# Patient Record
Sex: FEMALE | Race: WHITE | NOT HISPANIC OR LATINO
[De-identification: names, ages, dates, MRNs, and addresses within clinical notes are randomized per-mention and may not be internally consistent; named-entity substitution may affect disease eponyms.]

---

## 2017-11-10 ENCOUNTER — APPOINTMENT (OUTPATIENT)
Dept: MRI IMAGING | Facility: CLINIC | Age: 47
End: 2017-11-10
Payer: COMMERCIAL

## 2017-11-10 ENCOUNTER — APPOINTMENT (OUTPATIENT)
Dept: CARDIOLOGY | Facility: CLINIC | Age: 47
End: 2017-11-10
Payer: COMMERCIAL

## 2017-11-10 ENCOUNTER — OUTPATIENT (OUTPATIENT)
Dept: OUTPATIENT SERVICES | Facility: HOSPITAL | Age: 47
LOS: 1 days | End: 2017-11-10
Payer: COMMERCIAL

## 2017-11-10 DIAGNOSIS — Z00.8 ENCOUNTER FOR OTHER GENERAL EXAMINATION: ICD-10-CM

## 2017-11-10 PROCEDURE — 70551 MRI BRAIN STEM W/O DYE: CPT | Mod: 26

## 2017-11-10 PROCEDURE — 93880 EXTRACRANIAL BILAT STUDY: CPT

## 2017-11-10 PROCEDURE — 70551 MRI BRAIN STEM W/O DYE: CPT

## 2018-11-08 ENCOUNTER — RX RENEWAL (OUTPATIENT)
Age: 48
End: 2018-11-08

## 2018-12-28 ENCOUNTER — APPOINTMENT (OUTPATIENT)
Dept: PULMONOLOGY | Facility: CLINIC | Age: 48
End: 2018-12-28
Payer: COMMERCIAL

## 2018-12-28 VITALS
OXYGEN SATURATION: 97 % | TEMPERATURE: 98.8 F | DIASTOLIC BLOOD PRESSURE: 76 MMHG | RESPIRATION RATE: 12 BRPM | SYSTOLIC BLOOD PRESSURE: 122 MMHG | HEART RATE: 81 BPM

## 2018-12-28 DIAGNOSIS — Z87.09 PERSONAL HISTORY OF OTHER DISEASES OF THE RESPIRATORY SYSTEM: ICD-10-CM

## 2018-12-28 PROCEDURE — 71046 X-RAY EXAM CHEST 2 VIEWS: CPT

## 2018-12-28 PROCEDURE — 99214 OFFICE O/P EST MOD 30 MIN: CPT | Mod: 25

## 2019-02-20 ENCOUNTER — RX RENEWAL (OUTPATIENT)
Age: 49
End: 2019-02-20

## 2019-03-02 ENCOUNTER — RX RENEWAL (OUTPATIENT)
Age: 49
End: 2019-03-02

## 2019-03-02 DIAGNOSIS — K21.9 GASTRO-ESOPHAGEAL REFLUX DISEASE W/OUT ESOPHAGITIS: ICD-10-CM

## 2019-05-08 ENCOUNTER — RX RENEWAL (OUTPATIENT)
Age: 49
End: 2019-05-08

## 2019-05-29 ENCOUNTER — RESULT REVIEW (OUTPATIENT)
Age: 49
End: 2019-05-29

## 2019-08-19 DIAGNOSIS — D05.10 INTRADUCTAL CARCINOMA IN SITU OF UNSPECIFIED BREAST: ICD-10-CM

## 2019-08-21 ENCOUNTER — RX CHANGE (OUTPATIENT)
Age: 49
End: 2019-08-21

## 2019-08-21 ENCOUNTER — APPOINTMENT (OUTPATIENT)
Dept: PULMONOLOGY | Facility: CLINIC | Age: 49
End: 2019-08-21
Payer: COMMERCIAL

## 2019-08-21 VITALS
OXYGEN SATURATION: 98 % | DIASTOLIC BLOOD PRESSURE: 80 MMHG | TEMPERATURE: 97.5 F | HEART RATE: 91 BPM | SYSTOLIC BLOOD PRESSURE: 114 MMHG

## 2019-08-21 DIAGNOSIS — J32.9 CHRONIC SINUSITIS, UNSPECIFIED: ICD-10-CM

## 2019-08-21 DIAGNOSIS — C50.919 MALIGNANT NEOPLASM OF UNSPECIFIED SITE OF UNSPECIFIED FEMALE BREAST: ICD-10-CM

## 2019-08-21 PROBLEM — D05.10 DCIS (DUCTAL CARCINOMA IN SITU) OF BREAST: Status: ACTIVE | Noted: 2019-08-21

## 2019-08-21 LAB
ALBUMIN SERPL ELPH-MCNC: 4 G/DL
ALP BLD-CCNC: 145 U/L
ALT SERPL-CCNC: 22 U/L
ANION GAP SERPL CALC-SCNC: 13 MMOL/L
AST SERPL-CCNC: 28 U/L
BASOPHILS # BLD AUTO: 0.07 K/UL
BASOPHILS NFR BLD AUTO: 0.8 %
BILIRUB SERPL-MCNC: 0.2 MG/DL
BUN SERPL-MCNC: 14 MG/DL
CALCIUM SERPL-MCNC: 9.8 MG/DL
CHLORIDE SERPL-SCNC: 100 MMOL/L
CO2 SERPL-SCNC: 27 MMOL/L
CREAT SERPL-MCNC: 0.71 MG/DL
EOSINOPHIL # BLD AUTO: 0.34 K/UL
EOSINOPHIL NFR BLD AUTO: 4 %
GLUCOSE SERPL-MCNC: 100 MG/DL
HCT VFR BLD CALC: 43.5 %
HGB BLD-MCNC: 14.5 G/DL
IMM GRANULOCYTES NFR BLD AUTO: 0.5 %
LYMPHOCYTES # BLD AUTO: 1.78 K/UL
LYMPHOCYTES NFR BLD AUTO: 21 %
MAN DIFF?: NORMAL
MCHC RBC-ENTMCNC: 31.2 PG
MCHC RBC-ENTMCNC: 33.3 GM/DL
MCV RBC AUTO: 93.5 FL
MONOCYTES # BLD AUTO: 0.56 K/UL
MONOCYTES NFR BLD AUTO: 6.6 %
NEUTROPHILS # BLD AUTO: 5.69 K/UL
NEUTROPHILS NFR BLD AUTO: 67.1 %
PLATELET # BLD AUTO: 345 K/UL
POTASSIUM SERPL-SCNC: 4.6 MMOL/L
PROT SERPL-MCNC: 7.5 G/DL
RBC # BLD: 4.65 M/UL
RBC # FLD: 12.6 %
SODIUM SERPL-SCNC: 140 MMOL/L
WBC # FLD AUTO: 8.48 K/UL

## 2019-08-21 PROCEDURE — 99214 OFFICE O/P EST MOD 30 MIN: CPT | Mod: 25

## 2019-08-21 PROCEDURE — 71046 X-RAY EXAM CHEST 2 VIEWS: CPT

## 2019-08-21 PROCEDURE — 94060 EVALUATION OF WHEEZING: CPT

## 2019-08-21 PROCEDURE — 36415 COLL VENOUS BLD VENIPUNCTURE: CPT

## 2019-08-21 RX ORDER — HYDROCODONE BITARTRATE AND HOMATROPINE METHYLBROMIDE 5; 1.5 MG/5ML; MG/5ML
5-1.5 SYRUP ORAL
Qty: 200 | Refills: 0 | Status: DISCONTINUED | COMMUNITY
Start: 2018-12-28 | End: 2019-08-21

## 2019-08-21 RX ORDER — PREDNISONE 10 MG/1
10 TABLET ORAL
Qty: 30 | Refills: 1 | Status: DISCONTINUED | COMMUNITY
Start: 2018-12-28 | End: 2019-08-21

## 2019-08-21 RX ORDER — CEFUROXIME AXETIL 500 MG/1
500 TABLET ORAL
Qty: 14 | Refills: 0 | Status: DISCONTINUED | COMMUNITY
Start: 2019-08-21 | End: 2019-08-21

## 2019-08-21 NOTE — PHYSICAL EXAM
[General Appearance - Well Developed] : well developed [Normal Appearance] : normal appearance [Well Groomed] : well groomed [No Deformities] : no deformities [General Appearance - Well Nourished] : well nourished [Normal Conjunctiva] : the conjunctiva exhibited no abnormalities [General Appearance - In No Acute Distress] : no acute distress [Eyelids - No Xanthelasma] : the eyelids demonstrated no xanthelasmas [Normal Oropharynx] : normal oropharynx [I] : I [Neck Appearance] : the appearance of the neck was normal [Neck Cervical Mass (___cm)] : no neck mass was observed [Jugular Venous Distention Increased] : there was no jugular-venous distention [Thyroid Diffuse Enlargement] : the thyroid was not enlarged [Heart Rate And Rhythm] : heart rate and rhythm were normal [Heart Sounds] : normal S1 and S2 [Murmurs] : no murmurs present [Arterial Pulses Normal] : the arterial pulses were normal [Edema] : no peripheral edema present [Veins - Varicosity Changes] : no varicosital changes were noted in the lower extremities [Respiration, Rhythm And Depth] : normal respiratory rhythm and effort [Exaggerated Use Of Accessory Muscles For Inspiration] : no accessory muscle use [Auscultation Breath Sounds / Voice Sounds] : lungs were clear to auscultation bilaterally [Chest Palpation] : palpation of the chest revealed no abnormalities [Lungs Percussion] : the lungs were normal to percussion [Gait - Sufficient For Exercise Testing] : the gait was sufficient for exercise testing [Abnormal Walk] : normal gait [Nail Clubbing] : no clubbing of the fingernails [Cyanosis, Localized] : no localized cyanosis [Petechial Hemorrhages (___cm)] : no petechial hemorrhages [Skin Color & Pigmentation] : normal skin color and pigmentation [] : no rash [No Venous Stasis] : no venous stasis [Skin Lesions] : no skin lesions [No Skin Ulcers] : no skin ulcer [No Xanthoma] : no  xanthoma was observed [Deep Tendon Reflexes (DTR)] : deep tendon reflexes were 2+ and symmetric [Sensation] : the sensory exam was normal to light touch and pinprick [Oriented To Time, Place, And Person] : oriented to person, place, and time [No Focal Deficits] : no focal deficits [Impaired Insight] : insight and judgment were intact [Affect] : the affect was normal [FreeTextEntry1] : Negative stridor

## 2019-08-21 NOTE — REVIEW OF SYSTEMS
[Nasal Congestion] : nasal congestion [Sinus Problems] : sinus problems [Cough] : cough [Hypertension] : ~T hypertension [Negative] : Endocrine [Dry Eyes] : no dryness of the eyes [Eye Irritation] : no ~T irritation of the eyes [Ear Disturbance] : no ear disturbance [Epistaxis] : no nosebleeds [Postnasal Drip] : no postnasal drip [Sore Throat] : no sore throat [Dry Mouth] : no dry mouth [Mouth Ulcers] : no mouth ulcers [Sputum] : not coughing up ~M sputum [Hemoptysis] : no hemoptysis [Dyspnea] : no dyspnea [Chest Tightness] : no chest tightness [Pleuritic Pain] : no pleuritic pain [Frequent URIs] : no frequent upper respiratory infections [Wheezing] : no wheezing [de-identified] : New dx DCIS Breast cancer

## 2019-08-21 NOTE — PROCEDURE
[FreeTextEntry1] : Chest x-ray PA lateral August 21, 2019\par New diagnosis breast cancer DCIS\par Cardiac size normal\par Clear lung fields without evidence of parenchymal infiltrates, pulmonary nodules, pleural effusions, mediastinal adenopathy.\par Soft tissue bony structures normal.\par Impression clear lungs\par \par Spirometry August 21, 2019\par Normal spirometry\par 50% response to bronchodilator at the FEV1\par \par Blood draw for CBC comprehensive metabolic profile\par Data review\par CBC normal range\par White blood count 8.48 hemoglobin 14.5 hematocrit 43.5%\par Platelet count 345,000\par Serum lites normal\par LFTs normal but noted mild elevation alkaline phosphatase 145

## 2019-08-21 NOTE — HISTORY OF PRESENT ILLNESS
[FreeTextEntry1] : Status post biopsy right breast\par Positive for invasive ductal carcinoma moderately differentiated\par High-grade nuclear grade solid type with central necrosis and calcification\par Chief complaint today active sinus symptoms with pain at sinus nasal congestion minimal cough without wheeze\par No fevers chills or sweats\par Description of purulent sputum\par No hemoptysis\par No associated chest pain\par

## 2019-08-21 NOTE — REVIEW OF SYSTEMS
[Nasal Congestion] : nasal congestion [Sinus Problems] : sinus problems [Cough] : cough [Hypertension] : ~T hypertension [Negative] : Endocrine [Dry Eyes] : no dryness of the eyes [Eye Irritation] : no ~T irritation of the eyes [Ear Disturbance] : no ear disturbance [Epistaxis] : no nosebleeds [Postnasal Drip] : no postnasal drip [Sore Throat] : no sore throat [Dry Mouth] : no dry mouth [Mouth Ulcers] : no mouth ulcers [Sputum] : not coughing up ~M sputum [Hemoptysis] : no hemoptysis [Dyspnea] : no dyspnea [Chest Tightness] : no chest tightness [Pleuritic Pain] : no pleuritic pain [Frequent URIs] : no frequent upper respiratory infections [Wheezing] : no wheezing [de-identified] : New dx DCIS Breast cancer

## 2019-08-21 NOTE — PHYSICAL EXAM
[General Appearance - Well Developed] : well developed [Normal Appearance] : normal appearance [Well Groomed] : well groomed [General Appearance - Well Nourished] : well nourished [No Deformities] : no deformities [General Appearance - In No Acute Distress] : no acute distress [Normal Conjunctiva] : the conjunctiva exhibited no abnormalities [Eyelids - No Xanthelasma] : the eyelids demonstrated no xanthelasmas [Normal Oropharynx] : normal oropharynx [I] : I [Neck Appearance] : the appearance of the neck was normal [Neck Cervical Mass (___cm)] : no neck mass was observed [Jugular Venous Distention Increased] : there was no jugular-venous distention [Thyroid Diffuse Enlargement] : the thyroid was not enlarged [Heart Sounds] : normal S1 and S2 [Heart Rate And Rhythm] : heart rate and rhythm were normal [Murmurs] : no murmurs present [Edema] : no peripheral edema present [Arterial Pulses Normal] : the arterial pulses were normal [Veins - Varicosity Changes] : no varicosital changes were noted in the lower extremities [Respiration, Rhythm And Depth] : normal respiratory rhythm and effort [Exaggerated Use Of Accessory Muscles For Inspiration] : no accessory muscle use [Auscultation Breath Sounds / Voice Sounds] : lungs were clear to auscultation bilaterally [Chest Palpation] : palpation of the chest revealed no abnormalities [Lungs Percussion] : the lungs were normal to percussion [Gait - Sufficient For Exercise Testing] : the gait was sufficient for exercise testing [Abnormal Walk] : normal gait [Nail Clubbing] : no clubbing of the fingernails [Cyanosis, Localized] : no localized cyanosis [Petechial Hemorrhages (___cm)] : no petechial hemorrhages [Skin Color & Pigmentation] : normal skin color and pigmentation [] : no rash [No Venous Stasis] : no venous stasis [No Skin Ulcers] : no skin ulcer [Skin Lesions] : no skin lesions [No Xanthoma] : no  xanthoma was observed [Deep Tendon Reflexes (DTR)] : deep tendon reflexes were 2+ and symmetric [Sensation] : the sensory exam was normal to light touch and pinprick [No Focal Deficits] : no focal deficits [Oriented To Time, Place, And Person] : oriented to person, place, and time [Impaired Insight] : insight and judgment were intact [Affect] : the affect was normal [FreeTextEntry1] : Negative stridor

## 2019-08-21 NOTE — DISCUSSION/SUMMARY
[FreeTextEntry1] : Sinusitis acute\par Reactivity on spirometry\par New diagnosis breast cancer DCIS\par \par Treatment protocol for sinusitis\par Zyrtec-D\par Nasal Flonase\par Ceftin 500 mg 1 tablet p.o. twice daily x7 days-quested change\par Doxycycline 100 mg 1 tablet p.o. twice daily\par Medrol Dosepak\par \par Patient has scheduled breast surgical consultation this afternoon Diane Cavazos MD

## 2019-08-23 ENCOUNTER — RX RENEWAL (OUTPATIENT)
Age: 49
End: 2019-08-23

## 2019-09-10 ENCOUNTER — RESULT REVIEW (OUTPATIENT)
Age: 49
End: 2019-09-10

## 2019-10-01 ENCOUNTER — RX CHANGE (OUTPATIENT)
Age: 49
End: 2019-10-01

## 2019-10-01 RX ORDER — OLMESARTAN MEDOXOMIL AND HYDROCHLOROTHIAZIDE 40; 12.5 MG/1; MG/1
40-12.5 TABLET ORAL DAILY
Qty: 90 | Refills: 1 | Status: DISCONTINUED | COMMUNITY
Start: 2019-09-10 | End: 2019-10-01

## 2020-02-21 ENCOUNTER — RX RENEWAL (OUTPATIENT)
Age: 50
End: 2020-02-21

## 2020-08-20 ENCOUNTER — RX RENEWAL (OUTPATIENT)
Age: 50
End: 2020-08-20

## 2020-09-11 ENCOUNTER — RX RENEWAL (OUTPATIENT)
Age: 50
End: 2020-09-11

## 2020-12-16 PROBLEM — Z87.09 HISTORY OF ACUTE BRONCHITIS: Status: RESOLVED | Noted: 2018-12-28 | Resolved: 2020-12-16

## 2020-12-18 ENCOUNTER — RX RENEWAL (OUTPATIENT)
Age: 50
End: 2020-12-18

## 2021-01-27 ENCOUNTER — RX RENEWAL (OUTPATIENT)
Age: 51
End: 2021-01-27

## 2021-03-07 ENCOUNTER — RX RENEWAL (OUTPATIENT)
Age: 51
End: 2021-03-07

## 2021-06-16 ENCOUNTER — RX RENEWAL (OUTPATIENT)
Age: 51
End: 2021-06-16

## 2021-07-06 ENCOUNTER — NON-APPOINTMENT (OUTPATIENT)
Age: 51
End: 2021-07-06

## 2021-07-09 ENCOUNTER — APPOINTMENT (OUTPATIENT)
Dept: PULMONOLOGY | Facility: CLINIC | Age: 51
End: 2021-07-09
Payer: COMMERCIAL

## 2021-07-09 VITALS
TEMPERATURE: 98.1 F | DIASTOLIC BLOOD PRESSURE: 76 MMHG | HEART RATE: 86 BPM | OXYGEN SATURATION: 96 % | SYSTOLIC BLOOD PRESSURE: 116 MMHG

## 2021-07-09 PROCEDURE — 71046 X-RAY EXAM CHEST 2 VIEWS: CPT

## 2021-07-09 PROCEDURE — 94729 DIFFUSING CAPACITY: CPT

## 2021-07-09 PROCEDURE — ZZZZZ: CPT

## 2021-07-09 PROCEDURE — 88738 HGB QUANT TRANSCUTANEOUS: CPT

## 2021-07-09 PROCEDURE — 99214 OFFICE O/P EST MOD 30 MIN: CPT | Mod: 25

## 2021-07-09 PROCEDURE — 94727 GAS DIL/WSHOT DETER LNG VOL: CPT

## 2021-07-09 PROCEDURE — 94010 BREATHING CAPACITY TEST: CPT

## 2021-07-09 RX ORDER — DOXYCYCLINE HYCLATE 100 MG/1
100 CAPSULE ORAL
Qty: 14 | Refills: 0 | Status: DISCONTINUED | COMMUNITY
Start: 2019-08-21 | End: 2021-07-09

## 2021-07-09 RX ORDER — TAMOXIFEN CITRATE 20 MG/1
20 TABLET, FILM COATED ORAL DAILY
Refills: 0 | Status: ACTIVE | COMMUNITY
Start: 2021-07-09

## 2021-07-09 RX ORDER — ALBUTEROL SULFATE 90 UG/1
108 (90 BASE) INHALANT RESPIRATORY (INHALATION)
Qty: 1 | Refills: 3 | Status: ACTIVE | COMMUNITY
Start: 2021-07-09 | End: 1900-01-01

## 2021-07-09 RX ORDER — FLUTICASONE PROPIONATE 50 UG/1
50 SPRAY, METERED NASAL
Qty: 1 | Refills: 3 | Status: DISCONTINUED | COMMUNITY
Start: 2019-08-21 | End: 2021-07-09

## 2021-07-09 RX ORDER — METHYLPREDNISOLONE 4 MG/1
4 TABLET ORAL
Qty: 1 | Refills: 0 | Status: DISCONTINUED | COMMUNITY
Start: 2019-08-21 | End: 2021-07-09

## 2021-07-09 NOTE — REVIEW OF SYSTEMS
[Nasal Congestion] : nasal congestion [Sinus Problems] : sinus problems [Cough] : cough [Hypertension] : ~T hypertension [Negative] : Endocrine [Dry Eyes] : no dryness of the eyes [Eye Irritation] : no ~T irritation of the eyes [Ear Disturbance] : no ear disturbance [Epistaxis] : no nosebleeds [Postnasal Drip] : no postnasal drip [Sore Throat] : no sore throat [Dry Mouth] : no dry mouth [Mouth Ulcers] : no mouth ulcers [Sputum] : not coughing up ~M sputum [Hemoptysis] : no hemoptysis [Dyspnea] : no dyspnea [Chest Tightness] : no chest tightness [Pleuritic Pain] : no pleuritic pain [Frequent URIs] : no frequent upper respiratory infections [Wheezing] : no wheezing [de-identified] : New dx DCIS Breast cancer

## 2021-07-09 NOTE — HISTORY OF PRESENT ILLNESS
[FreeTextEntry1] : 3 of inflammatory airway disease asthma\par Overall status is stable\par States no decline in respiratory status over time\par Denies active wheezing cough purulent sputum\par No history of reported COVID-19 infection\par Occasional use of her short acting bronchodilator when she is exposed to rabbits\par \par  biopsy right breast\par Positive for invasive ductal carcinoma moderately differentiated\par High-grade nuclear grade solid type with central necrosis and calcification\par overall feels lung fx is  stable\par occ prn use GRACY with rabbit exposure\par No fevers chills or sweats\par Description of purulent sputum\par No hemoptysis\par No associated chest pain\par

## 2021-07-09 NOTE — DISCUSSION/SUMMARY
[FreeTextEntry1] :  diagnosis breast cancer DCIS\par sinusitis hx\par OAD\par Rx updated\par No evidence for active respiratory impairment\par Stable pulmonary physiology\par As needed short acting bronchodilator\par Notify of any wheezing.  Notify if rescue inhaler is needed greater than 2-3 times in any week.  Avoid known triggers.\par COVID-19 precautions\par 1 year recheck

## 2021-07-09 NOTE — PROCEDURE
[FreeTextEntry1] : PFT without bronchodilator July 9, 2021\par Flow rates are normal\par Lung volumes normal\par Total lung capacity 93% predicted\par Diffusion normal 95% predicted\par Stable pulmonary physiology\par Hemoglobin 11.2\par \par Chest x-ray PA lateral  7/9/21\par  diagnosis breast cancer DCIS\par Cardiac size normal\par Clear lung fields without evidence of parenchymal infiltrates, pulmonary nodules, pleural effusions, mediastinal adenopathy.\par Soft tissue bony structures normal.\par Impression clear lungs\par \par Spirometry August 21, 2019\par Normal spirometry\par 50% response to bronchodilator at the FEV1\par \par Blood draw for CBC comprehensive metabolic profile\par Data review\par CBC normal range\par White blood count 8.48 hemoglobin 14.5 hematocrit 43.5%\par Platelet count 345,000\par Serum lites normal\par LFTs normal but noted mild elevation alkaline phosphatase 145

## 2021-07-09 NOTE — PHYSICAL EXAM
[General Appearance - Well Developed] : well developed [Normal Appearance] : normal appearance [Well Groomed] : well groomed [General Appearance - Well Nourished] : well nourished [No Deformities] : no deformities [General Appearance - In No Acute Distress] : no acute distress [Normal Conjunctiva] : the conjunctiva exhibited no abnormalities [Eyelids - No Xanthelasma] : the eyelids demonstrated no xanthelasmas [Normal Oropharynx] : normal oropharynx [I] : I [Neck Appearance] : the appearance of the neck was normal [Neck Cervical Mass (___cm)] : no neck mass was observed [Jugular Venous Distention Increased] : there was no jugular-venous distention [Thyroid Diffuse Enlargement] : the thyroid was not enlarged [Heart Rate And Rhythm] : heart rate and rhythm were normal [Heart Sounds] : normal S1 and S2 [Murmurs] : no murmurs present [Arterial Pulses Normal] : the arterial pulses were normal [Edema] : no peripheral edema present [Veins - Varicosity Changes] : no varicosital changes were noted in the lower extremities [Respiration, Rhythm And Depth] : normal respiratory rhythm and effort [Exaggerated Use Of Accessory Muscles For Inspiration] : no accessory muscle use [Auscultation Breath Sounds / Voice Sounds] : lungs were clear to auscultation bilaterally [Chest Palpation] : palpation of the chest revealed no abnormalities [Lungs Percussion] : the lungs were normal to percussion [Abnormal Walk] : normal gait [Gait - Sufficient For Exercise Testing] : the gait was sufficient for exercise testing [Nail Clubbing] : no clubbing of the fingernails [Cyanosis, Localized] : no localized cyanosis [Petechial Hemorrhages (___cm)] : no petechial hemorrhages [Skin Color & Pigmentation] : normal skin color and pigmentation [] : no rash [No Venous Stasis] : no venous stasis [Skin Lesions] : no skin lesions [No Skin Ulcers] : no skin ulcer [No Xanthoma] : no  xanthoma was observed [Deep Tendon Reflexes (DTR)] : deep tendon reflexes were 2+ and symmetric [Sensation] : the sensory exam was normal to light touch and pinprick [No Focal Deficits] : no focal deficits [Oriented To Time, Place, And Person] : oriented to person, place, and time [Impaired Insight] : insight and judgment were intact [Affect] : the affect was normal [FreeTextEntry1] : Negative stridor

## 2021-08-12 DIAGNOSIS — Z00.00 ENCOUNTER FOR GENERAL ADULT MEDICAL EXAMINATION W/OUT ABNORMAL FINDINGS: ICD-10-CM

## 2021-08-13 LAB
ALBUMIN SERPL ELPH-MCNC: 4.6 G/DL
ALP BLD-CCNC: 92 U/L
ALT SERPL-CCNC: 30 U/L
ANION GAP SERPL CALC-SCNC: 16 MMOL/L
AST SERPL-CCNC: 61 U/L
BASOPHILS # BLD AUTO: 0.05 K/UL
BASOPHILS NFR BLD AUTO: 0.5 %
BILIRUB SERPL-MCNC: 0.3 MG/DL
BUN SERPL-MCNC: 14 MG/DL
CALCIUM SERPL-MCNC: 9.5 MG/DL
CHLORIDE SERPL-SCNC: 101 MMOL/L
CO2 SERPL-SCNC: 23 MMOL/L
CREAT SERPL-MCNC: 0.87 MG/DL
CRP SERPL-MCNC: 14 MG/L
EOSINOPHIL # BLD AUTO: 0.42 K/UL
EOSINOPHIL NFR BLD AUTO: 4.4 %
ERYTHROCYTE [SEDIMENTATION RATE] IN BLOOD BY WESTERGREN METHOD: 34 MM/HR
GLUCOSE SERPL-MCNC: 125 MG/DL
HCT VFR BLD CALC: 39.7 %
HGB BLD-MCNC: 12.7 G/DL
IMM GRANULOCYTES NFR BLD AUTO: 0.2 %
LYMPHOCYTES # BLD AUTO: 1.74 K/UL
LYMPHOCYTES NFR BLD AUTO: 18.4 %
MAN DIFF?: NORMAL
MCHC RBC-ENTMCNC: 31.5 PG
MCHC RBC-ENTMCNC: 32 GM/DL
MCV RBC AUTO: 98.5 FL
MONOCYTES # BLD AUTO: 0.65 K/UL
MONOCYTES NFR BLD AUTO: 6.9 %
NEUTROPHILS # BLD AUTO: 6.56 K/UL
NEUTROPHILS NFR BLD AUTO: 69.6 %
PLATELET # BLD AUTO: 254 K/UL
POTASSIUM SERPL-SCNC: 4.1 MMOL/L
PROT SERPL-MCNC: 7.5 G/DL
RBC # BLD: 4.03 M/UL
RBC # FLD: 12.4 %
SODIUM SERPL-SCNC: 140 MMOL/L
WBC # FLD AUTO: 9.44 K/UL

## 2021-08-14 LAB — RPR SER-TITR: NORMAL

## 2021-08-31 ENCOUNTER — RX RENEWAL (OUTPATIENT)
Age: 51
End: 2021-08-31

## 2021-09-28 ENCOUNTER — APPOINTMENT (OUTPATIENT)
Dept: DERMATOLOGY | Facility: CLINIC | Age: 51
End: 2021-09-28
Payer: COMMERCIAL

## 2021-09-28 PROCEDURE — 99204 OFFICE O/P NEW MOD 45 MIN: CPT

## 2021-09-28 RX ORDER — HYDROCORTISONE 25 MG/G
2.5 OINTMENT TOPICAL
Qty: 1 | Refills: 1 | Status: ACTIVE | COMMUNITY
Start: 2021-09-28 | End: 1900-01-01

## 2021-11-10 ENCOUNTER — APPOINTMENT (OUTPATIENT)
Dept: DERMATOLOGY | Facility: CLINIC | Age: 51
End: 2021-11-10

## 2021-12-12 ENCOUNTER — RX RENEWAL (OUTPATIENT)
Age: 51
End: 2021-12-12

## 2022-02-24 ENCOUNTER — RX RENEWAL (OUTPATIENT)
Age: 52
End: 2022-02-24

## 2022-03-02 ENCOUNTER — RX RENEWAL (OUTPATIENT)
Age: 52
End: 2022-03-02

## 2022-03-28 ENCOUNTER — APPOINTMENT (OUTPATIENT)
Dept: PULMONOLOGY | Facility: CLINIC | Age: 52
End: 2022-03-28
Payer: COMMERCIAL

## 2022-03-28 VITALS
HEART RATE: 96 BPM | OXYGEN SATURATION: 99 % | SYSTOLIC BLOOD PRESSURE: 145 MMHG | TEMPERATURE: 97.2 F | DIASTOLIC BLOOD PRESSURE: 94 MMHG

## 2022-03-28 DIAGNOSIS — R06.2 WHEEZING: ICD-10-CM

## 2022-03-28 DIAGNOSIS — R05.9 COUGH, UNSPECIFIED: ICD-10-CM

## 2022-03-28 PROCEDURE — 71046 X-RAY EXAM CHEST 2 VIEWS: CPT

## 2022-03-28 PROCEDURE — 99214 OFFICE O/P EST MOD 30 MIN: CPT | Mod: 25

## 2022-03-28 PROCEDURE — 94010 BREATHING CAPACITY TEST: CPT

## 2022-03-28 RX ORDER — MONTELUKAST SODIUM 10 MG/1
10 TABLET, FILM COATED ORAL
Qty: 1 | Refills: 3 | Status: DISCONTINUED | COMMUNITY
Start: 2021-07-09 | End: 2022-03-28

## 2022-03-28 RX ORDER — CHLORHEXIDINE GLUCONATE, 0.12% ORAL RINSE 1.2 MG/ML
0.12 SOLUTION DENTAL
Qty: 473 | Refills: 0 | Status: DISCONTINUED | COMMUNITY
Start: 2021-12-21

## 2022-03-28 RX ORDER — FLUTICASONE FUROATE AND VILANTEROL TRIFENATATE 200; 25 UG/1; UG/1
200-25 POWDER RESPIRATORY (INHALATION)
Qty: 1 | Refills: 3 | Status: DISCONTINUED | COMMUNITY
Start: 2021-07-27 | End: 2022-03-28

## 2022-03-28 RX ORDER — MELOXICAM 15 MG/1
15 TABLET ORAL
Qty: 90 | Refills: 0 | Status: DISCONTINUED | COMMUNITY
Start: 2021-11-15

## 2022-03-28 RX ORDER — TACROLIMUS 1 MG/G
0.1 OINTMENT TOPICAL
Qty: 60 | Refills: 0 | Status: DISCONTINUED | COMMUNITY
Start: 2021-09-28 | End: 2022-03-28

## 2022-03-28 RX ORDER — DOXYCYCLINE HYCLATE 100 MG/1
100 TABLET ORAL
Qty: 60 | Refills: 0 | Status: DISCONTINUED | COMMUNITY
Start: 2021-09-28 | End: 2022-03-28

## 2022-03-28 RX ORDER — AMOXICILLIN 500 MG/1
500 CAPSULE ORAL
Qty: 30 | Refills: 0 | Status: DISCONTINUED | COMMUNITY
Start: 2021-12-21

## 2022-03-28 NOTE — DISCUSSION/SUMMARY
[FreeTextEntry1] : Muscle cramps\par Hold hydrochlorothiazide still blood pressure not well controlled\par Check serum electrolytes magnesium phosphorus\par Continue losartan 100 mg daily\par Add low-dose Norvasc 2.5 mg\par Recheck blood pressure 1 month\par Add Medrol Dosepak Z-Deejay for cough repeat PFT 1 month\par has  been off BREO\par  add symbicort 2 puffs BID and Rinse \par Notify of any wheezing.  Notify if rescue inhaler is needed greater than 2-3 times in any week.  Avoid known triggers.\par \par \par  diagnosis breast cancer DCIS\par sinusitis hx\par OAD\par Rx updated\par No evidence for active respiratory impairment\par Stable pulmonary physiology\par As needed short acting bronchodilator\par Notify of any wheezing.  Notify if rescue inhaler is needed greater than 2-3 times in any week.  Avoid known triggers.\par COVID-19 precautions\par 1 year recheck

## 2022-03-28 NOTE — REVIEW OF SYSTEMS
[Nasal Congestion] : nasal congestion [Sinus Problems] : sinus problems [Cough] : cough [Hypertension] : ~T hypertension [Negative] : Endocrine [Dry Eyes] : no dryness of the eyes [Eye Irritation] : no ~T irritation of the eyes [Ear Disturbance] : no ear disturbance [Epistaxis] : no nosebleeds [Postnasal Drip] : no postnasal drip [Sore Throat] : no sore throat [Dry Mouth] : no dry mouth [Mouth Ulcers] : no mouth ulcers [Sputum] : not coughing up ~M sputum [Hemoptysis] : no hemoptysis [Dyspnea] : no dyspnea [Chest Tightness] : no chest tightness [Pleuritic Pain] : no pleuritic pain [Frequent URIs] : no frequent upper respiratory infections [Wheezing] : no wheezing [de-identified] : New dx DCIS Breast cancer

## 2022-03-28 NOTE — HISTORY OF PRESENT ILLNESS
[FreeTextEntry1] : am awakening with lower ext muscle cramps\par dry hacking cough\par pos wheeze\par use of GRACY but muscle cramps precede the cough sxs\par  sputum\par \par Hx of inflammatory airway disease asthma\par Overall status is stable\par States no decline in respiratory status over time\par Denies active wheezing cough purulent sputum\par No history of reported COVID-19 infection\par Occasional use of her short acting bronchodilator when she is exposed to rabbits\par \par  biopsy right breast\par Positive for invasive ductal carcinoma moderately differentiated\par High-grade nuclear grade solid type with central necrosis and calcification\par overall feels lung fx is  stable\par occ prn use GRACY with rabbit exposure\par No fevers chills or sweats\par Description of purulent sputum\par No hemoptysis\par No associated chest pain\par

## 2022-03-28 NOTE — PROCEDURE
[FreeTextEntry1] : Spirometry no bronchodilator March 28, 2022\par Flow rates normal\par Ratio 79\par There is a greater than 200 cc decline at both the FEV1 FVC compared to data of July 9, 2021\par \par Chest x-ray PA lateral March 28, 2022\par Cardiac size normal\par Lung fields are clear\par No parenchymal infiltrates pleural effusions dominant pulmonary nodules\par Soft tissue bony structures unremarkable\par Jennifer mediastinum unremarkable\par Impression clear lungs\par Some technique differences there is no interval change compared to chest x-ray July 9, 2021\par \par PFT without bronchodilator July 9, 2021\par Flow rates are normal\par Lung volumes normal\par Total lung capacity 93% predicted\par Diffusion normal 95% predicted\par Stable pulmonary physiology\par Hemoglobin 11.2\par \par Chest x-ray PA lateral  7/9/21\par  diagnosis breast cancer DCIS\par Cardiac size normal\par Clear lung fields without evidence of parenchymal infiltrates, pulmonary nodules, pleural effusions, mediastinal adenopathy.\par Soft tissue bony structures normal.\par Impression clear lungs\par \par Spirometry August 21, 2019\par Normal spirometry\par 50% response to bronchodilator at the FEV1\par \par Blood draw for CBC comprehensive metabolic profile\par Data review\par CBC normal range\par White blood count 8.48 hemoglobin 14.5 hematocrit 43.5%\par Platelet count 345,000\par Serum lites normal\par LFTs normal but noted mild elevation alkaline phosphatase 145

## 2022-03-29 ENCOUNTER — NON-APPOINTMENT (OUTPATIENT)
Age: 52
End: 2022-03-29

## 2022-03-29 LAB
ANION GAP SERPL CALC-SCNC: 14 MMOL/L
BASOPHILS # BLD AUTO: 0.07 K/UL
BASOPHILS NFR BLD AUTO: 1.1 %
BUN SERPL-MCNC: 14 MG/DL
CALCIUM SERPL-MCNC: 9.7 MG/DL
CHLORIDE SERPL-SCNC: 102 MMOL/L
CO2 SERPL-SCNC: 27 MMOL/L
CREAT SERPL-MCNC: 0.7 MG/DL
EGFR: 103 ML/MIN/1.73M2
EOSINOPHIL # BLD AUTO: 0.64 K/UL
EOSINOPHIL NFR BLD AUTO: 9.7 %
GLUCOSE SERPL-MCNC: 95 MG/DL
HCT VFR BLD CALC: 40.6 %
HGB BLD-MCNC: 13.1 G/DL
IMM GRANULOCYTES NFR BLD AUTO: 0.3 %
LYMPHOCYTES # BLD AUTO: 1.9 K/UL
LYMPHOCYTES NFR BLD AUTO: 28.8 %
MAGNESIUM SERPL-MCNC: 2 MG/DL
MAN DIFF?: NORMAL
MCHC RBC-ENTMCNC: 30.7 PG
MCHC RBC-ENTMCNC: 32.3 GM/DL
MCV RBC AUTO: 95.1 FL
MONOCYTES # BLD AUTO: 0.57 K/UL
MONOCYTES NFR BLD AUTO: 8.6 %
NEUTROPHILS # BLD AUTO: 3.39 K/UL
NEUTROPHILS NFR BLD AUTO: 51.5 %
PHOSPHATE SERPL-MCNC: 3.3 MG/DL
PLATELET # BLD AUTO: 252 K/UL
POTASSIUM SERPL-SCNC: 4.7 MMOL/L
RBC # BLD: 4.27 M/UL
RBC # FLD: 12.7 %
SODIUM SERPL-SCNC: 143 MMOL/L
WBC # FLD AUTO: 6.59 K/UL

## 2022-04-12 ENCOUNTER — RESULT REVIEW (OUTPATIENT)
Age: 52
End: 2022-04-12

## 2022-04-21 ENCOUNTER — NON-APPOINTMENT (OUTPATIENT)
Age: 52
End: 2022-04-21

## 2022-04-21 RX ORDER — FLUTICASONE PROPIONATE AND SALMETEROL XINAFOATE 115; 21 UG/1; UG/1
115-21 AEROSOL, METERED RESPIRATORY (INHALATION)
Qty: 1 | Refills: 3 | Status: DISCONTINUED | COMMUNITY
Start: 2022-03-31 | End: 2022-04-21

## 2022-04-21 RX ORDER — BUDESONIDE AND FORMOTEROL FUMARATE DIHYDRATE 160; 4.5 UG/1; UG/1
160-4.5 AEROSOL RESPIRATORY (INHALATION)
Qty: 1 | Refills: 3 | Status: DISCONTINUED | COMMUNITY
Start: 2022-03-28 | End: 2022-04-21

## 2022-04-25 ENCOUNTER — APPOINTMENT (OUTPATIENT)
Dept: PULMONOLOGY | Facility: CLINIC | Age: 52
End: 2022-04-25
Payer: COMMERCIAL

## 2022-04-25 VITALS
HEART RATE: 86 BPM | TEMPERATURE: 98.2 F | DIASTOLIC BLOOD PRESSURE: 86 MMHG | OXYGEN SATURATION: 98 % | SYSTOLIC BLOOD PRESSURE: 130 MMHG

## 2022-04-25 DIAGNOSIS — Z23 ENCOUNTER FOR IMMUNIZATION: ICD-10-CM

## 2022-04-25 PROCEDURE — 99214 OFFICE O/P EST MOD 30 MIN: CPT

## 2022-04-25 NOTE — HISTORY OF PRESENT ILLNESS
[FreeTextEntry1] : am awakening with lower ext muscle cramps better off HCTZ\par withaddition of low dose norvasc noted  facial flushing\par dry hacking cough- little  better\par pos wheeze\par use of GRACY but muscle cramps precede the cough sxs\par  sputum\par \par Hx of inflammatory airway disease asthma\par Overall status is stable\par States no decline in respiratory status over time\par Denies active wheezing cough purulent sputum\par No history of reported COVID-19 infection\par Occasional use of her short acting bronchodilator when she is exposed to rabbits\par \par  biopsy right breast\par Positive for invasive ductal carcinoma moderately differentiated\par High-grade nuclear grade solid type with central necrosis and calcification\par overall feels lung fx is  stable\par occ prn use GRACY with rabbit exposure\par No fevers chills or sweats\par Description of purulent sputum\par No hemoptysis\par No associated chest pain\par

## 2022-04-25 NOTE — DISCUSSION/SUMMARY
[FreeTextEntry1] : Muscle cramps better\par Hold hydrochlorothiazide still blood pressure not well controlled\par Check serum electrolytes magnesium phosphorus reviewed\par Continue losartan 100 mg daily\par Add low-dose Norvasc 2.5 mg\par Recheck blood pressure 1 month\par Add Medrol Dosepak Z-Deejay completed for cough repeat PFT 1 month\par has  been off BREO\par  add symbicort 2 puffs BID and Rinse OFF sec cost\par Notify of any wheezing.  Notify if rescue inhaler is needed greater than 2-3 times in any week.  Avoid known triggers.\par  diagnosis breast cancer DCIS\par sinusitis hx\par OAD\par Rx updated\par No evidence for active respiratory impairment\par Stable pulmonary physiology\par As needed short acting bronchodilator\par Notify of any wheezing.  Notify if rescue inhaler is needed greater than 2-3 times in any week.  Avoid known triggers.\par COVID-19 precautions\par Noted cost with rx advair equivalents\par Wixela and will check re Dulera and Air Duo\par sample Trelegy 100 mcg 1 puff QD and Rinse

## 2022-04-25 NOTE — PHYSICAL EXAM
[General Appearance - Well Developed] : well developed [Normal Appearance] : normal appearance [Well Groomed] : well groomed [General Appearance - Well Nourished] : well nourished [No Deformities] : no deformities [General Appearance - In No Acute Distress] : no acute distress [Normal Conjunctiva] : the conjunctiva exhibited no abnormalities [Eyelids - No Xanthelasma] : the eyelids demonstrated no xanthelasmas [Normal Oropharynx] : normal oropharynx [I] : I [Neck Appearance] : the appearance of the neck was normal [Neck Cervical Mass (___cm)] : no neck mass was observed [Jugular Venous Distention Increased] : there was no jugular-venous distention [Thyroid Diffuse Enlargement] : the thyroid was not enlarged [FreeTextEntry1] : Negative stridor [Heart Rate And Rhythm] : heart rate and rhythm were normal [Heart Sounds] : normal S1 and S2 [Murmurs] : no murmurs present [Arterial Pulses Normal] : the arterial pulses were normal [Edema] : no peripheral edema present [Veins - Varicosity Changes] : no varicosital changes were noted in the lower extremities [Respiration, Rhythm And Depth] : normal respiratory rhythm and effort [Exaggerated Use Of Accessory Muscles For Inspiration] : no accessory muscle use [Auscultation Breath Sounds / Voice Sounds] : lungs were clear to auscultation bilaterally [Chest Palpation] : palpation of the chest revealed no abnormalities [Lungs Percussion] : the lungs were normal to percussion [Abnormal Walk] : normal gait [Gait - Sufficient For Exercise Testing] : the gait was sufficient for exercise testing [Nail Clubbing] : no clubbing of the fingernails [Cyanosis, Localized] : no localized cyanosis [Petechial Hemorrhages (___cm)] : no petechial hemorrhages [Skin Color & Pigmentation] : normal skin color and pigmentation [] : no rash [No Venous Stasis] : no venous stasis [Skin Lesions] : no skin lesions [No Skin Ulcers] : no skin ulcer [No Xanthoma] : no  xanthoma was observed [Deep Tendon Reflexes (DTR)] : deep tendon reflexes were 2+ and symmetric [Sensation] : the sensory exam was normal to light touch and pinprick [No Focal Deficits] : no focal deficits [Oriented To Time, Place, And Person] : oriented to person, place, and time [Impaired Insight] : insight and judgment were intact [Affect] : the affect was normal

## 2022-04-25 NOTE — REVIEW OF SYSTEMS
[Dry Eyes] : no dryness of the eyes [Eye Irritation] : no ~T irritation of the eyes [Ear Disturbance] : no ear disturbance [Nasal Congestion] : nasal congestion [Epistaxis] : no nosebleeds [Postnasal Drip] : no postnasal drip [Sinus Problems] : sinus problems [Sore Throat] : no sore throat [Dry Mouth] : no dry mouth [Mouth Ulcers] : no mouth ulcers [Cough] : cough [Sputum] : not coughing up ~M sputum [Hemoptysis] : no hemoptysis [Dyspnea] : no dyspnea [Chest Tightness] : no chest tightness [Pleuritic Pain] : no pleuritic pain [Frequent URIs] : no frequent upper respiratory infections [Wheezing] : no wheezing [Hypertension] : ~T hypertension [Negative] : Endocrine [de-identified] : New dx DCIS Breast cancer

## 2022-05-23 ENCOUNTER — TRANSCRIPTION ENCOUNTER (OUTPATIENT)
Age: 52
End: 2022-05-23

## 2022-05-23 ENCOUNTER — APPOINTMENT (OUTPATIENT)
Dept: PULMONOLOGY | Facility: CLINIC | Age: 52
End: 2022-05-23
Payer: COMMERCIAL

## 2022-05-23 VITALS
HEART RATE: 90 BPM | TEMPERATURE: 96.5 F | SYSTOLIC BLOOD PRESSURE: 129 MMHG | OXYGEN SATURATION: 95 % | DIASTOLIC BLOOD PRESSURE: 77 MMHG

## 2022-05-23 DIAGNOSIS — D72.10 EOSINOPHILIA, UNSPECIFIED: ICD-10-CM

## 2022-05-23 PROCEDURE — 99215 OFFICE O/P EST HI 40 MIN: CPT

## 2022-05-23 RX ORDER — FLUTICASONE FUROATE AND VILANTEROL TRIFENATATE 100; 25 UG/1; UG/1
100-25 POWDER RESPIRATORY (INHALATION)
Qty: 1 | Refills: 3 | Status: DISCONTINUED | COMMUNITY
Start: 2022-04-22 | End: 2022-05-23

## 2022-05-23 RX ORDER — AZITHROMYCIN 250 MG/1
250 TABLET, FILM COATED ORAL
Qty: 1 | Refills: 0 | Status: DISCONTINUED | COMMUNITY
Start: 2022-03-28 | End: 2022-05-23

## 2022-05-23 RX ORDER — FLUTICASONE FUROATE AND VILANTEROL TRIFENATATE 200; 25 UG/1; UG/1
200-25 POWDER RESPIRATORY (INHALATION)
Qty: 1 | Refills: 3 | Status: DISCONTINUED | COMMUNITY
Start: 2022-04-21 | End: 2022-05-23

## 2022-05-23 RX ORDER — METHYLPREDNISOLONE 4 MG/1
4 TABLET ORAL
Qty: 1 | Refills: 0 | Status: DISCONTINUED | COMMUNITY
Start: 2022-03-28 | End: 2022-05-23

## 2022-05-23 NOTE — HISTORY OF PRESENT ILLNESS
[FreeTextEntry1] : am awakening with lower ext muscle cramps better off HCTZ- just a little bit\par with addition of low dose norvasc noted  facial flushing and no GI sxs or diarrhea \par no active wheeze\par use of GRACY but muscle cramps precede the cough sxs\par  sputum\par \par Hx of inflammatory airway disease asthma\par Overall status is stable\par States no decline in respiratory status over time\par Denies active wheezing cough purulent sputum\par No history of reported COVID-19 infection\par Occasional use of her short acting bronchodilator when she is exposed to rabbits\par \par  biopsy right breast\par Positive for invasive ductal carcinoma moderately differentiated\par High-grade nuclear grade solid type with central necrosis and calcification\par overall feels lung fx is  stable\par occ prn use GRACY with rabbit exposure\par No fevers chills or sweats\par Description of purulent sputum\par No hemoptysis\par No associated chest pain\par

## 2022-05-23 NOTE — DISCUSSION/SUMMARY
[FreeTextEntry1] : Muscle cramps better\par Hold hydrochlorothiazide still blood pressure not well controlled\par Check serum electrolytes magnesium phosphorus reviewed\par Continue losartan 100 mg daily\par Add low-dose Norvasc 2.5 mg\par Recheck blood pressure 1 month\par has  been off BREO and insurance issue cost\par  add symbicort 2 puffs BID and Rinse OFF sec cost\par order advair 100-50 m,cg 1 puff BID and Rinse\par Notify of any wheezing.  Notify if rescue inhaler is needed greater than 2-3 times in any week.  Avoid known triggers.\par  diagnosis breast cancer DCIS\par sinusitis hx\par OAD\par Rx updated\par No evidence for active respiratory impairment\par Stable pulmonary physiology\par As needed short acting bronchodilator\par Notify of any wheezing.  Notify if rescue inhaler is needed greater than 2-3 times in any week.  Avoid known triggers.\par COVID-19 precautions\par

## 2022-05-23 NOTE — REVIEW OF SYSTEMS
[Nasal Congestion] : nasal congestion [Sinus Problems] : sinus problems [Cough] : cough [Hypertension] : ~T hypertension [Negative] : Endocrine [Dry Eyes] : no dryness of the eyes [Eye Irritation] : no ~T irritation of the eyes [Ear Disturbance] : no ear disturbance [Epistaxis] : no nosebleeds [Postnasal Drip] : no postnasal drip [Sore Throat] : no sore throat [Dry Mouth] : no dry mouth [Mouth Ulcers] : no mouth ulcers [Sputum] : not coughing up ~M sputum [Hemoptysis] : no hemoptysis [Dyspnea] : no dyspnea [Chest Tightness] : no chest tightness [Pleuritic Pain] : no pleuritic pain [Frequent URIs] : no frequent upper respiratory infections [Wheezing] : no wheezing [de-identified] : New dx DCIS Breast cancer

## 2022-06-02 ENCOUNTER — RX RENEWAL (OUTPATIENT)
Age: 52
End: 2022-06-02

## 2022-07-13 ENCOUNTER — APPOINTMENT (OUTPATIENT)
Dept: PULMONOLOGY | Facility: CLINIC | Age: 52
End: 2022-07-13

## 2022-08-05 ENCOUNTER — APPOINTMENT (OUTPATIENT)
Dept: PULMONOLOGY | Facility: CLINIC | Age: 52
End: 2022-08-05

## 2022-08-05 VITALS
HEART RATE: 98 BPM | DIASTOLIC BLOOD PRESSURE: 88 MMHG | OXYGEN SATURATION: 96 % | RESPIRATION RATE: 16 BRPM | SYSTOLIC BLOOD PRESSURE: 159 MMHG | TEMPERATURE: 97.8 F

## 2022-08-05 DIAGNOSIS — R25.2 CRAMP AND SPASM: ICD-10-CM

## 2022-08-05 PROCEDURE — 36415 COLL VENOUS BLD VENIPUNCTURE: CPT

## 2022-08-05 PROCEDURE — 99214 OFFICE O/P EST MOD 30 MIN: CPT | Mod: 25

## 2022-08-05 RX ORDER — AMLODIPINE BESYLATE 2.5 MG/1
2.5 TABLET ORAL
Qty: 90 | Refills: 1 | Status: DISCONTINUED | COMMUNITY
Start: 2022-03-28 | End: 2022-08-05

## 2022-08-05 NOTE — REVIEW OF SYSTEMS
[Headache] : headache [Negative] : Genitourinary [Depression] : no depression [Diabetes] : no diabetes [Thyroid Problem] : no thyroid problem [TextBox_44] : Hypertension [TextBox_94] : Lower extremity edema below ankles with tenderness [TextBox_151] : History of breast cancer

## 2022-08-05 NOTE — DISCUSSION/SUMMARY
[FreeTextEntry1] : Lower extremity edema with pain no evidence that is clinically consistent with gout rule out secondary to Norvasc we will treat for inflammatory with Medrol pack Lasix only x3 days and watch for cramping recheck blood pressure avoid beta-blocker hold further calcium channel blocker\par Check serum electrolytes CBC sedimentation rate\par Continue losartan 100 mg daily\par Add low-dose Norvasc 2.5 mg-discontinue secondary to lower extremity edema\par Rule out gout\par Recheck blood pressure 1 month\par has  been off BREO\par  add symbicort 2 puffs BID and Rinse \par Notify of any wheezing.  Notify if rescue inhaler is needed greater than 2-3 times in any week.  Avoid known triggers.\par \par \par  diagnosis breast cancer DCIS\par sinusitis hx\par OAD\par Rx updated\par No evidence for active respiratory impairment\par Stable pulmonary physiology\par As needed short acting bronchodilator\par Notify of any wheezing.  Notify if rescue inhaler is needed greater than 2-3 times in any week.  Avoid known triggers.\par COVID-19 precautions\par 1 year recheck

## 2022-08-05 NOTE — REASON FOR VISIT
[Acute] : an acute visit [TextBox_44] : Lower extremity edema with pain, hypertension, history of gout

## 2022-08-06 ENCOUNTER — NON-APPOINTMENT (OUTPATIENT)
Age: 52
End: 2022-08-06

## 2022-08-06 LAB
ANION GAP SERPL CALC-SCNC: 13 MMOL/L
BASOPHILS # BLD AUTO: 0.05 K/UL
BASOPHILS NFR BLD AUTO: 0.8 %
BUN SERPL-MCNC: 14 MG/DL
CALCIUM SERPL-MCNC: 9.1 MG/DL
CHLORIDE SERPL-SCNC: 107 MMOL/L
CO2 SERPL-SCNC: 24 MMOL/L
CREAT SERPL-MCNC: 1.01 MG/DL
CRP SERPL-MCNC: 4 MG/L
DEPRECATED D DIMER PPP IA-ACNC: <150 NG/ML DDU
EGFR: 66 ML/MIN/1.73M2
EOSINOPHIL # BLD AUTO: 0.3 K/UL
EOSINOPHIL NFR BLD AUTO: 4.8 %
ERYTHROCYTE [SEDIMENTATION RATE] IN BLOOD BY WESTERGREN METHOD: 10 MM/HR
GLUCOSE SERPL-MCNC: 114 MG/DL
HCT VFR BLD CALC: 37.7 %
HGB BLD-MCNC: 12.4 G/DL
IMM GRANULOCYTES NFR BLD AUTO: 0.2 %
LYMPHOCYTES # BLD AUTO: 1.78 K/UL
LYMPHOCYTES NFR BLD AUTO: 28.3 %
MAN DIFF?: NORMAL
MCHC RBC-ENTMCNC: 31.9 PG
MCHC RBC-ENTMCNC: 32.9 GM/DL
MCV RBC AUTO: 96.9 FL
MONOCYTES # BLD AUTO: 0.53 K/UL
MONOCYTES NFR BLD AUTO: 8.4 %
NEUTROPHILS # BLD AUTO: 3.62 K/UL
NEUTROPHILS NFR BLD AUTO: 57.5 %
PLATELET # BLD AUTO: 205 K/UL
POTASSIUM SERPL-SCNC: 4.4 MMOL/L
RBC # BLD: 3.89 M/UL
RBC # FLD: 13 %
SODIUM SERPL-SCNC: 145 MMOL/L
URATE SERPL-MCNC: 6.8 MG/DL
WBC # FLD AUTO: 6.29 K/UL

## 2022-08-11 ENCOUNTER — APPOINTMENT (OUTPATIENT)
Dept: PULMONOLOGY | Facility: CLINIC | Age: 52
End: 2022-08-11

## 2022-08-11 VITALS
WEIGHT: 190 LBS | SYSTOLIC BLOOD PRESSURE: 122 MMHG | HEIGHT: 64 IN | BODY MASS INDEX: 32.44 KG/M2 | HEART RATE: 87 BPM | DIASTOLIC BLOOD PRESSURE: 72 MMHG | TEMPERATURE: 97.7 F | OXYGEN SATURATION: 96 %

## 2022-08-11 DIAGNOSIS — R60.0 LOCALIZED EDEMA: ICD-10-CM

## 2022-08-11 PROCEDURE — 99213 OFFICE O/P EST LOW 20 MIN: CPT

## 2022-08-11 NOTE — DISCUSSION/SUMMARY
[FreeTextEntry1] : Right foot pain edema resolved \par still with discomfort left lower  ext\par Lower extremity edema with pain no evidence that is clinically consistent with gout rule out secondary to Norvasc we will treat for inflammatory with Medrol pack Lasix only x3 days and watch for cramping recheck blood pressure avoid beta-blocker hold further calcium channel blocker\par Check serum electrolytes CBC sedimentation rate reviewed\par Continue losartan 100 mg daily\par Add low-dose Norvasc 2.5 mg-discontinue secondary to lower extremity edema\par Rule out gout\par Recheck blood pressure 1 month stable\par has  been off BREO\par  add symbicort 2 puffs BID and Rinse \par Notify of any wheezing.  Notify if rescue inhaler is needed greater than 2-3 times in any week.  Avoid known triggers.\par \par \par  diagnosis breast cancer DCIS\par sinusitis hx\par OAD\par Rx updated\par No evidence for active respiratory impairment\par Stable pulmonary physiology\par As needed short acting bronchodilator\par Notify of any wheezing.  Notify if rescue inhaler is needed greater than 2-3 times in any week.  Avoid known triggers.\par COVID-19 precautions\par 1 year recheck

## 2022-08-11 NOTE — HISTORY OF PRESENT ILLNESS
[TextBox_4] : Lower extremity edema with pain no evidence that is clinically consistent with gout rule out secondary to Norvasc we will treat for inflammatory with Medrol pack Lasix only x3 days and watch for cramping recheck blood pressure avoid beta-blocker hold further calcium channel blocker\par did use diuretic but held steroids\par swelling is  better\par \par am awakening with lower ext muscle cramps better off HCTZ- just a little bit\par with addition of low dose norvasc noted facial flushing and no GI sxs or diarrhea \par no active wheeze\par use of GRACY but muscle cramps precede the cough sxs\par  sputum\par \par Hx of inflammatory airway disease asthma\par Overall status is stable\par States no decline in respiratory status over time\par Denies active wheezing cough purulent sputum\par No history of reported COVID-19 infection\par Occasional use of her short acting bronchodilator when she is exposed to rabbits\par \par  biopsy right breast\par Positive for invasive ductal carcinoma moderately differentiated\par High-grade nuclear grade solid type with central necrosis and calcification\par overall feels lung fx is stable\par occ prn use GRACY with rabbit exposure\par No fevers chills or sweats\par Description of purulent sputum\par No hemoptysis\par No associated chest pain

## 2022-08-11 NOTE — REVIEW OF SYSTEMS
[Headache] : headache [Depression] : no depression [Diabetes] : no diabetes [Thyroid Problem] : no thyroid problem [TextBox_44] : Hypertension [TextBox_94] : Lower extremity edema below ankles with tenderness [TextBox_151] : History of breast cancer [Dry Eyes] : no dryness of the eyes [Eye Irritation] : no ~T irritation of the eyes [Ear Disturbance] : no ear disturbance [Nasal Congestion] : nasal congestion [Epistaxis] : no nosebleeds [Postnasal Drip] : no postnasal drip [Sinus Problems] : sinus problems [Sore Throat] : no sore throat [Dry Mouth] : no dry mouth [Mouth Ulcers] : no mouth ulcers [Cough] : cough [Sputum] : not coughing up ~M sputum [Hemoptysis] : no hemoptysis [Dyspnea] : no dyspnea [Chest Tightness] : no chest tightness [Pleuritic Pain] : no pleuritic pain [Frequent URIs] : no frequent upper respiratory infections [Wheezing] : no wheezing [Hypertension] : ~T hypertension [Negative] : Endocrine [de-identified] : New dx DCIS Breast cancer

## 2022-08-22 ENCOUNTER — APPOINTMENT (OUTPATIENT)
Dept: PULMONOLOGY | Facility: CLINIC | Age: 52
End: 2022-08-22

## 2022-08-22 VITALS
DIASTOLIC BLOOD PRESSURE: 83 MMHG | HEART RATE: 84 BPM | SYSTOLIC BLOOD PRESSURE: 119 MMHG | OXYGEN SATURATION: 98 % | TEMPERATURE: 98.7 F

## 2022-08-22 PROCEDURE — ZZZZZ: CPT

## 2022-08-22 PROCEDURE — 99213 OFFICE O/P EST LOW 20 MIN: CPT | Mod: 25

## 2022-08-22 PROCEDURE — 94010 BREATHING CAPACITY TEST: CPT

## 2022-08-22 PROCEDURE — 94729 DIFFUSING CAPACITY: CPT

## 2022-08-22 PROCEDURE — 94727 GAS DIL/WSHOT DETER LNG VOL: CPT

## 2022-08-22 NOTE — DISCUSSION/SUMMARY
[FreeTextEntry1] : Right foot pain edema resolved \par still with discomfort left lower  ext but improved\par Lower extremity edema Resolved \par recheck blood pressure avoid beta-blocker hold further calcium channel blocker- normal BP\par Check serum electrolytes CBC sedimentation rate reviewed\par Continue losartan 100 mg daily\par Add low-dose Norvasc 2.5 mg-discontinue secondary to lower extremity edema OFF\par Recheck blood pressure 3 month stable\par has  been off BREO\par  add symbicort 2 puffs BID and Rinse \par Notify of any wheezing.  Notify if rescue inhaler is needed greater than 2-3 times in any week.  Avoid known triggers.\par \par \par  diagnosis breast cancer DCIS\par sinusitis hx\par OAD\par Rx updated\par No evidence for active respiratory impairment\par Stable pulmonary physiology\par As needed short acting bronchodilator\par Notify of any wheezing.  Notify if rescue inhaler is needed greater than 2-3 times in any week.  Avoid known triggers.\par COVID-19 precautions\par

## 2022-08-22 NOTE — PROCEDURE
[FreeTextEntry1] : PFT no bronchodilator August 22, 2022\par Flow rates normal\par Lung volumes normal\par TLC 84% predicted\par Noted decreased at RV 48% predicted likely secondary to patient increased abdominal girth weight\par Diffusion normal 81% predicted\par Hemoglobin 12.4\par Data comparison to flow rates of March 28, 2022 demonstrates significant interval improvement\par There are some mild decline compared to data of July 9, 2021 at the TLC and diffusion\par \par Spirometry no bronchodilator March 28, 2022\par Flow rates normal\par Ratio 79\par There is a greater than 200 cc decline at both the FEV1 FVC compared to data of July 9, 2021\par \par Chest x-ray PA lateral March 28, 2022\par Cardiac size normal\par Lung fields are clear\par No parenchymal infiltrates pleural effusions dominant pulmonary nodules\par Soft tissue bony structures unremarkable\par Jennifer mediastinum unremarkable\par Impression clear lungs\par Some technique differences there is no interval change compared to chest x-ray July 9, 2021\par \par PFT without bronchodilator July 9, 2021\par Flow rates are normal\par Lung volumes normal\par Total lung capacity 93% predicted\par Diffusion normal 95% predicted\par Stable pulmonary physiology\par Hemoglobin 11.2\par \par Chest x-ray PA lateral  7/9/21\par  diagnosis breast cancer DCIS\par Cardiac size normal\par Clear lung fields without evidence of parenchymal infiltrates, pulmonary nodules, pleural effusions, mediastinal adenopathy.\par Soft tissue bony structures normal.\par Impression clear lungs\par \par Spirometry August 21, 2019\par Normal spirometry\par 50% response to bronchodilator at the FEV1\par \par Blood draw for CBC comprehensive metabolic profile\par Data review\par CBC normal range\par White blood count 8.48 hemoglobin 14.5 hematocrit 43.5%\par Platelet count 345,000\par Serum lites normal\par LFTs normal but noted mild elevation alkaline phosphatase 145

## 2022-08-22 NOTE — HISTORY OF PRESENT ILLNESS
[TextBox_4] : Lower extremity edema with pain no evidence that is clinically consistent with gout rule out secondary to Norvasc we will treat for inflammatory with Medrol pack Lasix only x3 days and watch for cramping recheck blood pressure avoid beta-blocker hold further calcium channel blocker\par did use diuretic but held steroids\par swelling is  better\par \par am awakening with lower ext muscle cramps better off HCTZ- just a little bit\par with addition of low dose norvasc noted facial flushing and no GI sxs or diarrhea \par no active wheeze\par use of GRACY but muscle cramps precede the cough sxs\par  sputum\par \par Hx of inflammatory airway disease asthma\par Overall status is stable\par States no decline in respiratory status over time\par Denies active wheezing cough purulent sputum\par No history of reported COVID-19 infection\par Occasional use of her short acting bronchodilator when she is exposed to rabbits\par \par  biopsy right breast\par Positive for invasive ductal carcinoma moderately differentiated\par High-grade nuclear grade solid type with central necrosis and calcification\par overall feels lung fx is stable\par \par occ prn use GRACY with rabbit exposure\par No fevers chills or sweats\par Description of purulent sputum\par No hemoptysis\par No associated chest pain

## 2022-08-22 NOTE — REVIEW OF SYSTEMS
[Headache] : headache [Nasal Congestion] : nasal congestion [Sinus Problems] : sinus problems [Cough] : cough [Hypertension] : ~T hypertension [Negative] : Endocrine [Depression] : no depression [Diabetes] : no diabetes [Thyroid Problem] : no thyroid problem [TextBox_44] : Hypertension [TextBox_94] : Lower extremity edema below ankles with tenderness [TextBox_151] : History of breast cancer [Dry Eyes] : no dryness of the eyes [Eye Irritation] : no ~T irritation of the eyes [Ear Disturbance] : no ear disturbance [Epistaxis] : no nosebleeds [Postnasal Drip] : no postnasal drip [Sore Throat] : no sore throat [Dry Mouth] : no dry mouth [Mouth Ulcers] : no mouth ulcers [Sputum] : not coughing up ~M sputum [Hemoptysis] : no hemoptysis [Dyspnea] : no dyspnea [Chest Tightness] : no chest tightness [Pleuritic Pain] : no pleuritic pain [Frequent URIs] : no frequent upper respiratory infections [Wheezing] : no wheezing [de-identified] : New dx DCIS Breast cancer

## 2022-08-27 ENCOUNTER — RX RENEWAL (OUTPATIENT)
Age: 52
End: 2022-08-27

## 2022-10-14 ENCOUNTER — NON-APPOINTMENT (OUTPATIENT)
Age: 52
End: 2022-10-14

## 2022-10-14 ENCOUNTER — APPOINTMENT (OUTPATIENT)
Dept: ORTHOPEDIC SURGERY | Facility: CLINIC | Age: 52
End: 2022-10-14

## 2022-10-14 VITALS — WEIGHT: 190 LBS | HEIGHT: 64 IN | BODY MASS INDEX: 32.44 KG/M2

## 2022-10-14 DIAGNOSIS — M25.562 PAIN IN LEFT KNEE: ICD-10-CM

## 2022-10-14 DIAGNOSIS — S83.242A OTHER TEAR OF MEDIAL MENISCUS, CURRENT INJURY, LEFT KNEE, INITIAL ENCOUNTER: ICD-10-CM

## 2022-10-14 PROCEDURE — 73564 X-RAY EXAM KNEE 4 OR MORE: CPT | Mod: 50

## 2022-10-14 PROCEDURE — 20610 DRAIN/INJ JOINT/BURSA W/O US: CPT | Mod: LT

## 2022-10-14 PROCEDURE — 99203 OFFICE O/P NEW LOW 30 MIN: CPT | Mod: 25

## 2022-10-17 LAB
B PERT IGG+IGM PNL SER: ABNORMAL
COLOR FLD: YELLOW
EOSINOPHIL # FLD MANUAL: 0 %
FLUID INTAKE SUBSTANCE CLASS: NORMAL
LYMPHOCYTES # FLD MANUAL: 18 %
MESOTHL CELL NFR FLD: 0 %
MONOS+MACROS NFR FLD MANUAL: 72 %
NEUTS SEG # FLD MANUAL: 10 %
NRBC # FLD: 0 %
RBC # FLD MANUAL: 7000 /UL
SYCRY CLARITY: ABNORMAL
SYCRY COLOR: YELLOW
SYCRY ID: NORMAL
SYCRY TUBE: NORMAL
TOTAL CELLS COUNTED FLD: 214 /UL
TUBE TYPE: NORMAL
UNIDENT CELLS NFR FLD MANUAL: 0 %
VARIANT LYMPHS # FLD MANUAL: 0 %

## 2022-10-21 ENCOUNTER — OUTPATIENT (OUTPATIENT)
Dept: OUTPATIENT SERVICES | Facility: HOSPITAL | Age: 52
LOS: 1 days | End: 2022-10-21
Payer: COMMERCIAL

## 2022-10-21 ENCOUNTER — APPOINTMENT (OUTPATIENT)
Dept: MRI IMAGING | Facility: IMAGING CENTER | Age: 52
End: 2022-10-21

## 2022-10-21 DIAGNOSIS — Z00.8 ENCOUNTER FOR OTHER GENERAL EXAMINATION: ICD-10-CM

## 2022-10-21 DIAGNOSIS — S83.242A OTHER TEAR OF MEDIAL MENISCUS, CURRENT INJURY, LEFT KNEE, INITIAL ENCOUNTER: ICD-10-CM

## 2022-10-21 PROCEDURE — 73721 MRI JNT OF LWR EXTRE W/O DYE: CPT | Mod: 26,LT

## 2022-10-21 PROCEDURE — 73721 MRI JNT OF LWR EXTRE W/O DYE: CPT

## 2022-10-26 ENCOUNTER — APPOINTMENT (OUTPATIENT)
Dept: ORTHOPEDIC SURGERY | Facility: CLINIC | Age: 52
End: 2022-10-26

## 2022-10-26 DIAGNOSIS — M25.462 EFFUSION, LEFT KNEE: ICD-10-CM

## 2022-10-26 DIAGNOSIS — R93.7 ABNORMAL FINDINGS ON DIAGNOSTIC IMAGING OF OTHER PARTS OF MUSCULOSKELETAL SYSTEM: ICD-10-CM

## 2022-10-26 PROCEDURE — G2012 BRIEF CHECK IN BY MD/QHP: CPT

## 2022-10-26 NOTE — PROCEDURE
[de-identified] : Discussed at length the procedure of a knee aspiration. The risks, benefits, convalescence and alternatives were reviewed. The possible side effects discussed included but were not limited to: pain, swelling, bleeding and infection. Following this discussion, the knee was prepped with betadine and under a sterile condition, an 18 gauge needle was inserted into the joint through a lateral approach just superior to the patella with the knee in an extended position. The fluid was aspirated and sent for evaluation. Upon withdrawal of the needle the site was cleaned with alcohol and a bandaid applied. The patient tolerated the aspiration well and there were no adverse effects. Post aspiration instructions included no strenuous activity for 24 hours, cryotherapy and if there are any adverse effects to contact the office.\par \par 40 cc of clear-yellow synovial fluid were aspirated from the Left  knee.

## 2022-10-26 NOTE — PHYSICAL EXAM
[de-identified] : General appearance: well nourished and hydrated, pleasant, alert and oriented x 3, cooperative.\par HEENT: Normocephalic, EOM intact, Nasal septum midline, Oral cavity clear, External auditory canal clear.\par Cardiovascular: no apparent abnormalities, no lower leg edema, no varicosities, pedal pulses are palpable.\par Lymphatics Lymph nodes: none palpated, Lymphedema: not present.\par Neurologic: sensation is normal, no muscle weakness in upper or lower extremities, patella tendon reflexes intact .\par Dermatologic no apparent skin lesions, moist, warm, no rash.\par Spine:cervical spine appears normal and moves freely, thoracic spine appears normal and moves freely, lumbosacral spine appears normal and moves freely.\par Gait: nonantalgic. \par left Knee\par left gait vericus modrrat effusion \par apprehesive thorughout exam\par Inspection:moderate effusion.\par Wounds: none.\par Alignment: normal.\par Palpation: no specific tenderness on palpation.\par ROM: Active (in degrees): 10-90\par Ligamentous laxity (neg): all ligaments appear stable, negative ant. drawer test, negative post. drawer test, stable to varus stress test, stable to valgus stress test, negative Lachman's test, negative pivot shift test,\par Meniscal Test: +McMurrays, +Michael.\par Patellofemoral Alignment Test: Q angle-, normal.\par Muscle Test: good quad strength.\par Leg examination: calf is soft and non-tender. \par \par right Knee\par Inspection: no effusion or erythema.\par Wounds: none.\par Alignment: normal.\par Palpation: no specific tenderness on palpation.\par ROM: Active (in degrees): 0-130\par Ligamentous laxity (neg): all ligaments appear stable, negative ant. drawer test, negative post. drawer test, stable to varus stress test, stable to valgus stress test, negative Lachman's test, negative pivot shift test,\par Meniscal Test: negative McMurrays, negative Michael.\par Patellofemoral Alignment Test: Q angle-, normal.\par Muscle Test: good quad strength.\par Leg examination: calf is soft and non-tender.  [de-identified] : bilateral knee xrays, standing AP/Lateral and Merchant films, and 45 degree PA standing view, taken at the office today show normal alignment, mild joint space narrowing, well centered patella, KL1.

## 2022-10-26 NOTE — DISCUSSION/SUMMARY
[de-identified] : Discussed at length the nature of the patient’s condition. \par Mild degenerative arthritis \par Left major problem, torn medial meniscus\par Aspiration done as detailed above\par Suggested we obtain an MRI of the left knee to rule out intra articular pathology. If MRI shows a torn meniscus, we will give thought to a surgical arthroscopy, which was discussed. When results are available, we will discuss further.\par Referral for physical therapy. \par Advil which gives her relief.\par She can continue activities as tolerated. All questions answered, understanding verbalized. Patient in agreement with plan of care.

## 2022-10-26 NOTE — HISTORY OF PRESENT ILLNESS
[de-identified] : 52 year old female presents for initial evaluation of \par Bilateral knee pain r>l\par Fell 1 year ago\par Saw orthopedist who took xrays\par Doesnt recall which knee because she fell on both\par Has meniscus tear that would resolve on its own\par Pt states pain took time to resolve develoepd pain a month ago and a few weeks ago on right \par Denies any recent injurt \par states ain is diffuse on left but mostly on lateral asepct\par Oveer anteiror aspect on right\par Throbbing in nature with swelling l>r\par States she cannot fully extend her lef tleg\par Has been walkkin with a limp pain slightly improves with walking\par Stairs difficult ine dru at a time\par Advil and melaxicam helped slightly\par Hx HTN, GERD\par In remission for breast Ca since 2020 on menaxicam

## 2022-10-26 NOTE — ADDENDUM
[FreeTextEntry1] : This note was written by Joe Polk on 10/14/2022 acting as scribe for Dr. Igor Tristan M.D.\par \par I, Dr. Igor Tristan, have read and attest that all the information, medical decision making and discharge instructions within are true and accurate.

## 2022-11-01 LAB — BACTERIA FLD CULT: NORMAL

## 2022-11-04 DIAGNOSIS — M10.9 GOUT, UNSPECIFIED: ICD-10-CM

## 2022-11-21 ENCOUNTER — APPOINTMENT (OUTPATIENT)
Dept: PULMONOLOGY | Facility: CLINIC | Age: 52
End: 2022-11-21

## 2022-11-21 VITALS — DIASTOLIC BLOOD PRESSURE: 78 MMHG | OXYGEN SATURATION: 99 % | SYSTOLIC BLOOD PRESSURE: 120 MMHG | HEART RATE: 96 BPM

## 2022-11-21 PROCEDURE — G0008: CPT

## 2022-11-21 PROCEDURE — 90686 IIV4 VACC NO PRSV 0.5 ML IM: CPT

## 2022-11-21 PROCEDURE — 99213 OFFICE O/P EST LOW 20 MIN: CPT | Mod: 25

## 2022-11-21 RX ORDER — HYDROCHLOROTHIAZIDE 12.5 MG/1
12.5 CAPSULE ORAL DAILY
Qty: 90 | Refills: 3 | Status: DISCONTINUED | COMMUNITY
Start: 2019-10-01 | End: 2022-11-21

## 2022-11-21 RX ORDER — METHYLPREDNISOLONE 4 MG/1
4 TABLET ORAL
Qty: 1 | Refills: 0 | Status: DISCONTINUED | COMMUNITY
Start: 2022-11-04 | End: 2022-11-21

## 2022-11-21 RX ORDER — METHYLPREDNISOLONE 4 MG/1
4 TABLET ORAL
Qty: 1 | Refills: 0 | Status: DISCONTINUED | COMMUNITY
Start: 2022-08-05 | End: 2022-11-21

## 2022-11-21 RX ORDER — FUROSEMIDE 20 MG/1
20 TABLET ORAL
Qty: 3 | Refills: 0 | Status: DISCONTINUED | COMMUNITY
Start: 2022-08-05 | End: 2022-11-21

## 2022-11-21 NOTE — REVIEW OF SYSTEMS
[Headache] : headache [Nasal Congestion] : nasal congestion [Sinus Problems] : sinus problems [Cough] : cough [Hypertension] : ~T hypertension [Negative] : Endocrine [Depression] : no depression [Diabetes] : no diabetes [Thyroid Problem] : no thyroid problem [TextBox_44] : Hypertension [TextBox_94] : Lower extremity edema below ankles with tenderness [TextBox_151] : History of breast cancer [Dry Eyes] : no dryness of the eyes [Eye Irritation] : no ~T irritation of the eyes [Ear Disturbance] : no ear disturbance [Epistaxis] : no nosebleeds [Postnasal Drip] : no postnasal drip [Sore Throat] : no sore throat [Dry Mouth] : no dry mouth [Mouth Ulcers] : no mouth ulcers [Sputum] : not coughing up ~M sputum [Hemoptysis] : no hemoptysis [Dyspnea] : no dyspnea [Chest Tightness] : no chest tightness [Pleuritic Pain] : no pleuritic pain [Frequent URIs] : no frequent upper respiratory infections [Wheezing] : no wheezing [de-identified] : New dx DCIS Breast cancer

## 2022-11-21 NOTE — REVIEW OF SYSTEMS
[Headache] : headache [Nasal Congestion] : nasal congestion [Sinus Problems] : sinus problems [Cough] : cough [Hypertension] : ~T hypertension [Negative] : Endocrine [Depression] : no depression [Diabetes] : no diabetes [Thyroid Problem] : no thyroid problem [TextBox_44] : Hypertension [TextBox_94] : Lower extremity edema below ankles with tenderness [TextBox_151] : History of breast cancer [Dry Eyes] : no dryness of the eyes [Eye Irritation] : no ~T irritation of the eyes [Ear Disturbance] : no ear disturbance [Epistaxis] : no nosebleeds [Postnasal Drip] : no postnasal drip [Sore Throat] : no sore throat [Dry Mouth] : no dry mouth [Mouth Ulcers] : no mouth ulcers [Sputum] : not coughing up ~M sputum [Hemoptysis] : no hemoptysis [Dyspnea] : no dyspnea [Chest Tightness] : no chest tightness [Pleuritic Pain] : no pleuritic pain [Frequent URIs] : no frequent upper respiratory infections [Wheezing] : no wheezing [de-identified] : New dx DCIS Breast cancer

## 2022-11-21 NOTE — DISCUSSION/SUMMARY
[FreeTextEntry1] : Right foot pain edema recurrent\par ORTHO noted\par Lower extremity edema Resolved \par recheck blood pressure avoid beta-blocker hold further calcium channel blocker- normal BP\par Check serum electrolytes CBC sedimentation rate reviewed\par Continue losartan 100 mg daily\par Add low-dose Norvasc 2.5 mg-discontinue secondary to lower extremity edema OFF\par Recheck blood pressure 3 month stable\par has  been off BREO\par  add symbicort 2 puffs BID and Rinse \par Notify of any wheezing.  Notify if rescue inhaler is needed greater than 2-3 times in any week.  Avoid known triggers.\par \par  diagnosis breast cancer DCIS\par sinusitis hx\par OAD\par Rx updated\par No evidence for active respiratory impairment\par Stable pulmonary physiology\par As needed short acting bronchodilator\par Notify of any wheezing.  Notify if rescue inhaler is needed greater than 2-3 times in any week.  Avoid known triggers.\par COVID-19 precautions\par Rheum consult  joint pains

## 2022-11-21 NOTE — PROCEDURE
[FreeTextEntry1] : PFT no bronchodilator August 22, 2022\par Flow rates normal\par Lung volumes normal\par TLC 84% predicted\par Noted decreased at RV 48% predicted likely secondary to patient increased abdominal girth weight\par Diffusion normal 81% predicted\par Hemoglobin 12.4\par Data comparison to flow rates of March 28, 2022 demonstrates significant interval improvement\par There are some mild decline compared to data of July 9, 2021 at the [facility] and diffusion\par \par Spirometry no bronchodilator March 28, 2022\par Flow rates normal\par Ratio 79\par There is a greater than 200 cc decline at both the FEV1 FVC compared to data of July 9, 2021\par \par Chest x-ray PA lateral March 28, 2022\par Cardiac size normal\par Lung fields are clear\par No parenchymal infiltrates pleural effusions dominant pulmonary nodules\par Soft tissue bony structures unremarkable\par Jennifer mediastinum unremarkable\par Impression clear lungs\par Some technique differences there is no interval change compared to chest x-ray July 9, 2021\par \par PFT without bronchodilator July 9, 2021\par Flow rates are normal\par Lung volumes normal\par Total lung capacity 93% predicted\par Diffusion normal 95% predicted\par Stable pulmonary physiology\par Hemoglobin 11.2\par \par Chest x-ray PA lateral  7/9/21\par  diagnosis breast cancer DCIS\par Cardiac size normal\par Clear lung fields without evidence of parenchymal infiltrates, pulmonary nodules, pleural effusions, mediastinal adenopathy.\par Soft tissue bony structures normal.\par Impression clear lungs\par \par Spirometry August 21, 2019\par Normal spirometry\par 50% response to bronchodilator at the FEV1\par \par

## 2022-11-27 ENCOUNTER — RX RENEWAL (OUTPATIENT)
Age: 52
End: 2022-11-27

## 2023-01-10 ENCOUNTER — APPOINTMENT (OUTPATIENT)
Dept: PULMONOLOGY | Facility: CLINIC | Age: 53
End: 2023-01-10
Payer: COMMERCIAL

## 2023-01-10 DIAGNOSIS — U07.1 COVID-19: ICD-10-CM

## 2023-01-10 DIAGNOSIS — J20.8 ACUTE BRONCHITIS DUE TO OTHER SPECIFIED ORGANISMS: ICD-10-CM

## 2023-01-10 PROCEDURE — 99213 OFFICE O/P EST LOW 20 MIN: CPT | Mod: 95

## 2023-01-10 NOTE — DISCUSSION/SUMMARY
[FreeTextEntry1] : Post COVID-19 infection\par Rule out post viral bronchitis secondary to above with a component of small airways disease\par Treatment doxycycline\par Prednisone taper\par Cough with codeine medication\par \par Right foot pain edema recurrent\par ORTHO noted\par Lower extremity edema Resolved \par recheck blood pressure avoid beta-blocker hold further calcium channel blocker- normal BP\par Check serum electrolytes CBC sedimentation rate reviewed\par Continue losartan 100 mg daily\par Add low-dose Norvasc 2.5 mg-discontinue secondary to lower extremity edema OFF\par Recheck blood pressure 3 month stable\par has  been off BREO\par  add symbicort 2 puffs BID and Rinse \par Notify of any wheezing.  Notify if rescue inhaler is needed greater than 2-3 times in any week.  Avoid known triggers.\par \par  diagnosis breast cancer DCIS\par sinusitis hx\par OAD\par Rx updated\par No evidence for active respiratory impairment\par Stable pulmonary physiology\par As needed short acting bronchodilator\par Notify of any wheezing.  Notify if rescue inhaler is needed greater than 2-3 times in any week.  Avoid known triggers.\par COVID-19 precautions\par Rheum consult  joint pains

## 2023-01-10 NOTE — HISTORY OF PRESENT ILLNESS
[Home] : at home, [unfilled] , at the time of the visit. [Medical Office: ([facility])___] : at the medical office located in  [Verbal consent obtained from patient] : the patient, [unfilled] [TextBox_4] : Post COVID-19 infection December 30, 2022\par Patient did not receive treatment either oral antiviral or infusion\par Subsequently developed significant hacking poor sleep chest congestion increased mucus but no wheezing\par No fevers chills or sweats\par

## 2023-02-13 ENCOUNTER — APPOINTMENT (OUTPATIENT)
Dept: PULMONOLOGY | Facility: CLINIC | Age: 53
End: 2023-02-13

## 2023-02-20 ENCOUNTER — RX RENEWAL (OUTPATIENT)
Age: 53
End: 2023-02-20

## 2023-05-21 ENCOUNTER — RX RENEWAL (OUTPATIENT)
Age: 53
End: 2023-05-21

## 2023-06-01 ENCOUNTER — APPOINTMENT (OUTPATIENT)
Dept: ORTHOPEDIC SURGERY | Facility: CLINIC | Age: 53
End: 2023-06-01
Payer: COMMERCIAL

## 2023-06-01 VITALS — BODY MASS INDEX: 30.73 KG/M2 | HEIGHT: 64 IN | WEIGHT: 180 LBS

## 2023-06-01 PROCEDURE — 73564 X-RAY EXAM KNEE 4 OR MORE: CPT | Mod: RT

## 2023-06-01 PROCEDURE — 20610 DRAIN/INJ JOINT/BURSA W/O US: CPT | Mod: RT

## 2023-06-01 PROCEDURE — 99214 OFFICE O/P EST MOD 30 MIN: CPT | Mod: 25

## 2023-06-01 NOTE — PROCEDURE
[de-identified] : Injection: Right knee joint cortisone injection\par Indication: Arthritis\par \par A discussion was had with the patient regarding this procedure and all questions were answered. All risks, benefits and alternatives were discussed. These included but were not limited to bleeding, infection, and allergic reaction. Alcohol was used to clean the skin, and betadine was used to sterilize and prep the area in the lateral aspect of the right knee. Ethyl chloride spray was then used as a topical anesthetic. A 22-gauge needle was used to inject 4cc of 2% lidocaine and 1cc of Kenalog into the knee. A sterile bandage was then applied. The patient tolerated the procedure well and there were no complications.\par

## 2023-06-01 NOTE — HISTORY OF PRESENT ILLNESS
[de-identified] : 52-year-old female presents with worsening right knee pain over the past 1 month, severe in intensity.  No associated injury.  She has severe medial knee pain associated with swelling, and described as radiating pain.  Her walking tolerance is reduced and she has been limping. Medication including NSAIDs such as Advil and activity modification have been minimally effective.  She also uses Voltaren gel and applies ice.  There is no pain radiating distally to the feet, and there is no numbness, tingling, or weakness.  She was seen for left knee pain approximately 8 months ago and was found to have a subchondral insufficiency fracture that healed well and she is not having any pain from it.

## 2023-06-01 NOTE — PHYSICAL EXAM
[de-identified] : General: No acute distress\par Mental: Alert and oriented x3\par Eyes: Conjunctivitis not seen\par Chest: Symmetric chest rise, no audible wheezing\par Skin: Bilateral lower extremities absent from rashes and ulcers\par Abdomen: No distention\par \par Right knee:\par Skin: Clean, dry, intact \par Inspection: 5 degrees varus malalignment, no masses, no swelling, large effusion. \par Tenderness: Positive MJLT. no LJLT. No tenderness over the medial and lateral patella facets. No ttp medial/lateral epicondyle, patella tendon, tibial tubercle, pes anserinus, or proximal fibula. \par ROM: 0 to 110° pain with deep flexio\par Stability: Stable to varus and valgus, negative lachman. Negative anterior/posterior drawer. \par Additional tests: Positive McMurrays test, positive Steinmann, Negative patellar grind test.  \par Strength: 5/5 Q/H/TA/GS/EHL, no atrophy \par Neuro: Sensation intact to light touch throughout in dp/sp/tib/dorita/saph nerve distributions\par Pulses: 2+ DP/PT pulses.\par  [de-identified] : X-rays today of the right knee including 4 views shows a varus alignment with severe narrowing of the medial joint space and subchondral sclerosis, no major osteophytes, osteopenia.

## 2023-06-01 NOTE — DISCUSSION/SUMMARY
[de-identified] : 52-year-old female with severe right knee pain secondary to degenerative arthritis.  We discussed the nature of this condition.  She may have developed a subchondral insufficiency fracture similar to the contralateral knee last year.\par I discussed the treatment of degenerative arthritis with the patient at length today. I described the spectrum of treatment from nonoperative modalities to total joint arthroplasty. Noninvasive and nonoperative treatment modalities include weight reduction, activity modification with low impact exercise, PRN use of acetaminophen or anti-inflammatory medication if tolerated, glucosamine/chondroitin supplements, and physical therapy. Further treatments can include corticosteroid injection and the use of hyaluronic acid injections. Definitive treatment can certainly include total joint arthroplasty also. \par Cortisone injection was performed as described.  Prescription for diclofenac was sent to her pharmacy.  I recommended limited weightbearing to the right leg over the next 3 weeks and then gradual return to activity.  She can do gentle stretching exercises at home.  Recommended follow-up in 2 months.

## 2023-06-12 ENCOUNTER — APPOINTMENT (OUTPATIENT)
Dept: ORTHOPEDIC SURGERY | Facility: CLINIC | Age: 53
End: 2023-06-12

## 2023-07-17 ENCOUNTER — APPOINTMENT (OUTPATIENT)
Dept: PULMONOLOGY | Facility: CLINIC | Age: 53
End: 2023-07-17
Payer: COMMERCIAL

## 2023-07-17 VITALS — DIASTOLIC BLOOD PRESSURE: 84 MMHG | HEART RATE: 98 BPM | OXYGEN SATURATION: 98 % | SYSTOLIC BLOOD PRESSURE: 140 MMHG

## 2023-07-17 DIAGNOSIS — K52.9 NONINFECTIVE GASTROENTERITIS AND COLITIS, UNSPECIFIED: ICD-10-CM

## 2023-07-17 PROCEDURE — 36415 COLL VENOUS BLD VENIPUNCTURE: CPT

## 2023-07-17 PROCEDURE — 99214 OFFICE O/P EST MOD 30 MIN: CPT | Mod: 25

## 2023-07-17 RX ORDER — RIFAXIMIN 200 MG/1
200 TABLET ORAL
Qty: 9 | Refills: 0 | Status: ACTIVE | COMMUNITY
Start: 2023-07-17 | End: 1900-01-01

## 2023-07-17 NOTE — HISTORY OF PRESENT ILLNESS
[Home] : at home, [unfilled] , at the time of the visit. [Medical Office: ([facility])___] : at the medical office located in  [Verbal consent obtained from patient] : the patient, [unfilled] [TextBox_4] : Post COVID-19 infection December 30, 2022\par Patient did not receive treatment either oral antiviral or infusion\par Subsequently developed significant hacking poor sleep chest congestion increased mucus but no wheezing\par No fevers chills or sweats\par

## 2023-07-17 NOTE — DISCUSSION/SUMMARY
[FreeTextEntry1] : r/o viral gastroenteritis\par labs \par stool collection\par  xuifiaxn tx CT ABD GI based on f/u\par \par Post COVID-19 infection\par post viral bronchitis secondary to above with a component of small airways disease\par Right foot pain edema recurrent\par ORTHO noted\par Lower extremity edema Resolved \par recheck blood pressure avoid beta-blocker hold further calcium channel blocker- normal BP\par Check serum electrolytes CBC sedimentation rate reviewed\par Continue losartan 100 mg daily\par Add low-dose Norvasc 2.5 mg-discontinue secondary to lower extremity edema OFF\par Recheck blood pressure 3 month stable\par has  been off BREO\par  add symbicort 2 puffs BID and Rinse \par Notify of any wheezing.  Notify if rescue inhaler is needed greater than 2-3 times in any week.  Avoid known triggers.\par \par  diagnosis breast cancer DCIS\par sinusitis hx\par OAD\par Rx updated\par No evidence for active respiratory impairment\par Stable pulmonary physiology\par As needed short acting bronchodilator\par Notify of any wheezing.  Notify if rescue inhaler is needed greater than 2-3 times in any week.  Avoid known triggers.\par COVID-19 precautions\par Rheum consult  joint pains

## 2023-07-18 ENCOUNTER — NON-APPOINTMENT (OUTPATIENT)
Age: 53
End: 2023-07-18

## 2023-07-18 LAB
ALBUMIN SERPL ELPH-MCNC: 4.2 G/DL
ALP BLD-CCNC: 91 U/L
ALT SERPL-CCNC: 47 U/L
AMYLASE/CREAT SERPL: 86 U/L
ANION GAP SERPL CALC-SCNC: 13 MMOL/L
AST SERPL-CCNC: 63 U/L
BILIRUB SERPL-MCNC: 0.2 MG/DL
BUN SERPL-MCNC: 11 MG/DL
CALCIUM SERPL-MCNC: 8.7 MG/DL
CHLORIDE SERPL-SCNC: 106 MMOL/L
CO2 SERPL-SCNC: 19 MMOL/L
CREAT SERPL-MCNC: 0.68 MG/DL
EGFR: 103 ML/MIN/1.73M2
GLUCOSE SERPL-MCNC: 113 MG/DL
LPL SERPL-CCNC: 86 U/L
POTASSIUM SERPL-SCNC: 3.6 MMOL/L
PROT SERPL-MCNC: 6.7 G/DL
SODIUM SERPL-SCNC: 139 MMOL/L

## 2023-07-19 ENCOUNTER — NON-APPOINTMENT (OUTPATIENT)
Age: 53
End: 2023-07-19

## 2023-07-19 ENCOUNTER — APPOINTMENT (OUTPATIENT)
Dept: GASTROENTEROLOGY | Facility: CLINIC | Age: 53
End: 2023-07-19
Payer: COMMERCIAL

## 2023-07-19 ENCOUNTER — RESULT CHARGE (OUTPATIENT)
Age: 53
End: 2023-07-19

## 2023-07-19 VITALS
OXYGEN SATURATION: 98 % | SYSTOLIC BLOOD PRESSURE: 128 MMHG | HEART RATE: 84 BPM | BODY MASS INDEX: 29.53 KG/M2 | DIASTOLIC BLOOD PRESSURE: 80 MMHG | WEIGHT: 173 LBS | HEIGHT: 64 IN | TEMPERATURE: 97.3 F

## 2023-07-19 LAB
DATE COLLECTED: NORMAL
DATE COLLECTED: NORMAL
GI PCR PANEL: NOT DETECTED
HEMOCCULT SP1 STL QL: NEGATIVE
QUALITY CONTROL: YES

## 2023-07-19 PROCEDURE — 99203 OFFICE O/P NEW LOW 30 MIN: CPT

## 2023-07-19 RX ORDER — HYDROCODONE BITARTRATE AND HOMATROPINE METHYLBROMIDE 5; 1.5 MG/5ML; MG/5ML
5-1.5 SOLUTION ORAL 4 TIMES DAILY
Qty: 300 | Refills: 0 | Status: DISCONTINUED | COMMUNITY
Start: 2023-01-10 | End: 2023-07-19

## 2023-07-19 RX ORDER — DOXYCYCLINE HYCLATE 100 MG/1
100 TABLET ORAL
Qty: 14 | Refills: 0 | Status: DISCONTINUED | COMMUNITY
Start: 2023-01-10 | End: 2023-07-19

## 2023-07-19 RX ORDER — TRIAMCINOLONE ACETONIDE 1 MG/G
0.1 OINTMENT TOPICAL
Qty: 1 | Refills: 0 | Status: DISCONTINUED | COMMUNITY
Start: 2021-09-28 | End: 2023-07-19

## 2023-07-19 RX ORDER — PREDNISONE 10 MG/1
10 TABLET ORAL
Qty: 30 | Refills: 1 | Status: DISCONTINUED | COMMUNITY
Start: 2023-01-10 | End: 2023-07-19

## 2023-07-19 RX ORDER — FLUTICASONE PROPIONATE AND SALMETEROL 100; 50 UG/1; UG/1
100-50 POWDER RESPIRATORY (INHALATION)
Qty: 180 | Refills: 1 | Status: DISCONTINUED | COMMUNITY
Start: 2022-05-23 | End: 2023-07-19

## 2023-07-19 NOTE — PHYSICAL EXAM

## 2023-07-19 NOTE — HISTORY OF PRESENT ILLNESS
[FreeTextEntry1] : oLra Mohan is a 53-year-old white female came for evaluation of watery diarrhea for 13 days, started with cold and chills.No outside food.Has toddler children at home .She also has a turtle as a pet, uses bare hands to clean the tank.No fever or chills now.Poor appetite and >10 lb weight loss in 2 wks.\par No colonoscopies in past.FIT neg.Had Cologuard last yr ,reportedly neg.\par She tried Imodium once 3 days ago without much relief.\par She denied any abdominal pain, nausea, vomiting now\par No history of recent travel or outside food.

## 2023-07-19 NOTE — REVIEW OF SYSTEMS
[Chills] : chills [Feeling Poorly] : feeling poorly [Feeling Tired] : feeling tired [Recent Weight Loss (___ Lbs)] : recent [unfilled] ~Ulb weight loss [Abdominal Pain] : abdominal pain [Diarrhea] : diarrhea [Negative] : Heme/Lymph

## 2023-07-19 NOTE — ASSESSMENT
[FreeTextEntry1] : ?  Most likely her symptoms are due to viral gastroenteritis.\par Unsure if handling of Turtle can contribute to her illness

## 2023-07-22 LAB
C DIFF TOXIN B QL PCR REFLEX: NORMAL
GDH ANTIGEN: NOT DETECTED
TOXIN A AND B: NOT DETECTED

## 2023-08-01 ENCOUNTER — RX RENEWAL (OUTPATIENT)
Age: 53
End: 2023-08-01

## 2023-08-02 ENCOUNTER — RX RENEWAL (OUTPATIENT)
Age: 53
End: 2023-08-02

## 2023-09-12 RX ORDER — FLUTICASONE PROPIONATE AND SALMETEROL 250; 50 UG/1; UG/1
250-50 POWDER RESPIRATORY (INHALATION)
Qty: 3 | Refills: 3 | Status: ACTIVE | COMMUNITY
Start: 2023-08-03 | End: 1900-01-01

## 2023-11-16 ENCOUNTER — RX RENEWAL (OUTPATIENT)
Age: 53
End: 2023-11-16

## 2023-12-08 ENCOUNTER — APPOINTMENT (OUTPATIENT)
Dept: ORTHOPEDIC SURGERY | Facility: CLINIC | Age: 53
End: 2023-12-08
Payer: COMMERCIAL

## 2023-12-08 VITALS — WEIGHT: 180 LBS | HEIGHT: 64 IN | BODY MASS INDEX: 30.73 KG/M2

## 2023-12-08 PROCEDURE — 73564 X-RAY EXAM KNEE 4 OR MORE: CPT | Mod: 50

## 2023-12-08 PROCEDURE — 99214 OFFICE O/P EST MOD 30 MIN: CPT

## 2023-12-11 RX ORDER — DICLOFENAC SODIUM 75 MG/1
75 TABLET, DELAYED RELEASE ORAL
Qty: 60 | Refills: 3 | Status: ACTIVE | COMMUNITY
Start: 2023-06-01 | End: 1900-01-01

## 2023-12-11 RX ORDER — NABUMETONE 500 MG/1
500 TABLET, FILM COATED ORAL
Qty: 60 | Refills: 0 | Status: DISCONTINUED | COMMUNITY
Start: 2023-12-08 | End: 2023-12-11

## 2024-01-10 ENCOUNTER — NON-APPOINTMENT (OUTPATIENT)
Age: 54
End: 2024-01-10

## 2024-02-09 ENCOUNTER — OUTPATIENT (OUTPATIENT)
Dept: OUTPATIENT SERVICES | Facility: HOSPITAL | Age: 54
LOS: 1 days | Discharge: HOME | End: 2024-02-09

## 2024-02-09 VITALS
TEMPERATURE: 98 F | WEIGHT: 187.83 LBS | SYSTOLIC BLOOD PRESSURE: 149 MMHG | OXYGEN SATURATION: 98 % | HEART RATE: 95 BPM | RESPIRATION RATE: 18 BRPM | DIASTOLIC BLOOD PRESSURE: 85 MMHG | HEIGHT: 64 IN

## 2024-02-09 DIAGNOSIS — Z98.890 OTHER SPECIFIED POSTPROCEDURAL STATES: Chronic | ICD-10-CM

## 2024-02-09 DIAGNOSIS — Z01.818 ENCOUNTER FOR OTHER PREPROCEDURAL EXAMINATION: ICD-10-CM

## 2024-02-09 DIAGNOSIS — M17.11 UNILATERAL PRIMARY OSTEOARTHRITIS, RIGHT KNEE: ICD-10-CM

## 2024-02-09 DIAGNOSIS — I10 ESSENTIAL (PRIMARY) HYPERTENSION: ICD-10-CM

## 2024-02-09 DIAGNOSIS — M17.12 UNILATERAL PRIMARY OSTEOARTHRITIS, LEFT KNEE: ICD-10-CM

## 2024-02-09 DIAGNOSIS — K21.9 GASTRO-ESOPHAGEAL REFLUX DISEASE WITHOUT ESOPHAGITIS: ICD-10-CM

## 2024-02-09 LAB
A1C WITH ESTIMATED AVERAGE GLUCOSE RESULT: 5.5 % — SIGNIFICANT CHANGE UP (ref 4–5.6)
ALBUMIN SERPL ELPH-MCNC: 3.4 G/DL — SIGNIFICANT CHANGE UP (ref 3.3–5)
ALP SERPL-CCNC: 94 U/L — SIGNIFICANT CHANGE UP (ref 40–120)
ALT FLD-CCNC: 43 U/L — SIGNIFICANT CHANGE UP (ref 12–78)
ANION GAP SERPL CALC-SCNC: 4 MMOL/L — LOW (ref 5–17)
APPEARANCE UR: ABNORMAL
APTT BLD: 37.7 SEC — HIGH (ref 24.5–35.6)
AST SERPL-CCNC: 65 U/L — HIGH (ref 15–37)
BACTERIA # UR AUTO: ABNORMAL /HPF
BASOPHILS # BLD AUTO: 0.05 K/UL — SIGNIFICANT CHANGE UP (ref 0–0.2)
BASOPHILS NFR BLD AUTO: 0.7 % — SIGNIFICANT CHANGE UP (ref 0–2)
BILIRUB SERPL-MCNC: 0.3 MG/DL — SIGNIFICANT CHANGE UP (ref 0.2–1.2)
BILIRUB UR-MCNC: NEGATIVE — SIGNIFICANT CHANGE UP
BLD GP AB SCN SERPL QL: SIGNIFICANT CHANGE UP
BUN SERPL-MCNC: 13 MG/DL — SIGNIFICANT CHANGE UP (ref 7–23)
CALCIUM SERPL-MCNC: 8.6 MG/DL — SIGNIFICANT CHANGE UP (ref 8.5–10.1)
CHLORIDE SERPL-SCNC: 108 MMOL/L — SIGNIFICANT CHANGE UP (ref 96–108)
CO2 SERPL-SCNC: 29 MMOL/L — SIGNIFICANT CHANGE UP (ref 22–31)
COLOR SPEC: YELLOW — SIGNIFICANT CHANGE UP
COMMENT - URINE: SIGNIFICANT CHANGE UP
CREAT SERPL-MCNC: 0.67 MG/DL — SIGNIFICANT CHANGE UP (ref 0.5–1.3)
DIFF PNL FLD: NEGATIVE — SIGNIFICANT CHANGE UP
EGFR: 103 ML/MIN/1.73M2 — SIGNIFICANT CHANGE UP
EOSINOPHIL # BLD AUTO: 0.24 K/UL — SIGNIFICANT CHANGE UP (ref 0–0.5)
EOSINOPHIL NFR BLD AUTO: 3.2 % — SIGNIFICANT CHANGE UP (ref 0–6)
EPI CELLS # UR: PRESENT
ESTIMATED AVERAGE GLUCOSE: 111 MG/DL — SIGNIFICANT CHANGE UP (ref 68–114)
GLUCOSE SERPL-MCNC: 99 MG/DL — SIGNIFICANT CHANGE UP (ref 70–99)
GLUCOSE UR QL: NEGATIVE MG/DL — SIGNIFICANT CHANGE UP
HCG SERPL-ACNC: <1 MIU/ML — SIGNIFICANT CHANGE UP
HCT VFR BLD CALC: 38.2 % — SIGNIFICANT CHANGE UP (ref 34.5–45)
HGB BLD-MCNC: 12.9 G/DL — SIGNIFICANT CHANGE UP (ref 11.5–15.5)
IMM GRANULOCYTES NFR BLD AUTO: 0.4 % — SIGNIFICANT CHANGE UP (ref 0–0.9)
INR BLD: 1.14 RATIO — SIGNIFICANT CHANGE UP (ref 0.85–1.18)
KETONES UR-MCNC: NEGATIVE MG/DL — SIGNIFICANT CHANGE UP
LEUKOCYTE ESTERASE UR-ACNC: ABNORMAL
LYMPHOCYTES # BLD AUTO: 1.73 K/UL — SIGNIFICANT CHANGE UP (ref 1–3.3)
LYMPHOCYTES # BLD AUTO: 23.2 % — SIGNIFICANT CHANGE UP (ref 13–44)
MCHC RBC-ENTMCNC: 31.5 PG — SIGNIFICANT CHANGE UP (ref 27–34)
MCHC RBC-ENTMCNC: 33.8 G/DL — SIGNIFICANT CHANGE UP (ref 32–36)
MCV RBC AUTO: 93.2 FL — SIGNIFICANT CHANGE UP (ref 80–100)
MONOCYTES # BLD AUTO: 0.49 K/UL — SIGNIFICANT CHANGE UP (ref 0–0.9)
MONOCYTES NFR BLD AUTO: 6.6 % — SIGNIFICANT CHANGE UP (ref 2–14)
NEUTROPHILS # BLD AUTO: 4.93 K/UL — SIGNIFICANT CHANGE UP (ref 1.8–7.4)
NEUTROPHILS NFR BLD AUTO: 65.9 % — SIGNIFICANT CHANGE UP (ref 43–77)
NITRITE UR-MCNC: NEGATIVE — SIGNIFICANT CHANGE UP
NRBC # BLD: 0 /100 WBCS — SIGNIFICANT CHANGE UP (ref 0–0)
PH UR: 5.5 — SIGNIFICANT CHANGE UP (ref 5–8)
PLATELET # BLD AUTO: 241 K/UL — SIGNIFICANT CHANGE UP (ref 150–400)
POTASSIUM SERPL-MCNC: 4 MMOL/L — SIGNIFICANT CHANGE UP (ref 3.5–5.3)
POTASSIUM SERPL-SCNC: 4 MMOL/L — SIGNIFICANT CHANGE UP (ref 3.5–5.3)
PROT SERPL-MCNC: 7.3 GM/DL — SIGNIFICANT CHANGE UP (ref 6–8.3)
PROT UR-MCNC: NEGATIVE MG/DL — SIGNIFICANT CHANGE UP
PROTHROM AB SERPL-ACNC: 13.6 SEC — HIGH (ref 9.5–13)
RBC # BLD: 4.1 M/UL — SIGNIFICANT CHANGE UP (ref 3.8–5.2)
RBC # FLD: 12.4 % — SIGNIFICANT CHANGE UP (ref 10.3–14.5)
RBC CASTS # UR COMP ASSIST: 0 /HPF — SIGNIFICANT CHANGE UP (ref 0–4)
SODIUM SERPL-SCNC: 141 MMOL/L — SIGNIFICANT CHANGE UP (ref 135–145)
SP GR SPEC: 1.02 — SIGNIFICANT CHANGE UP (ref 1–1.03)
UROBILINOGEN FLD QL: 0.2 MG/DL — SIGNIFICANT CHANGE UP (ref 0.2–1)
WBC # BLD: 7.47 K/UL — SIGNIFICANT CHANGE UP (ref 3.8–10.5)
WBC # FLD AUTO: 7.47 K/UL — SIGNIFICANT CHANGE UP (ref 3.8–10.5)
WBC UR QL: 2 /HPF — SIGNIFICANT CHANGE UP (ref 0–5)

## 2024-02-09 RX ORDER — SODIUM CHLORIDE 9 MG/ML
3 INJECTION INTRAMUSCULAR; INTRAVENOUS; SUBCUTANEOUS EVERY 8 HOURS
Refills: 0 | Status: DISCONTINUED | OUTPATIENT
Start: 2024-02-29 | End: 2024-03-01

## 2024-02-09 NOTE — H&P PST ADULT - NSICDXPASTMEDICALHX_GEN_ALL_CORE_FT
PAST MEDICAL HISTORY:  Breast cancer, left     GERD (gastroesophageal reflux disease)     Hypertension     Primary osteoarthritis of left knee

## 2024-02-09 NOTE — H&P PST ADULT - NEGATIVE GASTROINTESTINAL SYMPTOMS
no nausea/no vomiting/no constipation/no change in bowel habits/no abdominal pain/no melena/no hiccoughs

## 2024-02-09 NOTE — H&P PST ADULT - MUSCULOSKELETAL
details… right knee/ROM intact/decreased ROM due to pain/normal gait/strength 5/5 bilateral lower extremities

## 2024-02-09 NOTE — H&P PST ADULT - NEGATIVE MUSCULOSKELETAL SYMPTOMS
no joint swelling/no myalgia/no muscle cramps/no muscle weakness/no neck pain/no arm pain L/no arm pain R/no back pain/no leg pain L

## 2024-02-09 NOTE — OCCUPATIONAL THERAPY INITIAL EVALUATION ADULT - PERTINENT HX OF CURRENT PROBLEM, REHAB EVAL
R knee OA which impacts pts ability to perform functional tasks/transfers and mobility. Pt is scheduled for R TKR on 2/29/24.

## 2024-02-09 NOTE — H&P PST ADULT - HISTORY OF PRESENT ILLNESS
52yo female with medical h/o HTN, GERD, and pmhx Breast cancer left on Tamoxifen, c/o Knee pain , right r/t OA, presents today for PST for scheduled Right Total Knee Replacement on 2/29/2024

## 2024-02-09 NOTE — OCCUPATIONAL THERAPY INITIAL EVALUATION ADULT - ADDITIONAL COMMENTS
Pt lives with partner (Who can assist post op) in a private house with 1 threshold step (can fit walker) to enter. Once inside, the pt has 1 step (can fit walker) and then has 12 steps (Bilateral handrails half way up and then R handed all the way up) to reach main floor where the bedroom and bathroom is. The pts bathroom has a walk in shower stall, fixed/retractable shower head, standard toilet seat and no grab bars. The pt ambulates with no device and does not own any device for ambulation. The pt daily pain is a 4-5/10 at rest and a 8/10 with movement. The pt manages the pain with rest and Advil. The pt has no recent outpatient PT, no recent falls and has buckling of the knees. The pt wears glasses all the time, R handed, drives and has no hearing impairments.

## 2024-02-10 LAB
CULTURE RESULTS: SIGNIFICANT CHANGE UP
MRSA PCR RESULT.: SIGNIFICANT CHANGE UP
S AUREUS DNA NOSE QL NAA+PROBE: DETECTED
SPECIMEN SOURCE: SIGNIFICANT CHANGE UP
VIT D25+D1,25 OH+D1,25 PNL SERPL-MCNC: 32.7 PG/ML — SIGNIFICANT CHANGE UP (ref 19.9–79.3)

## 2024-02-12 RX ORDER — MUPIROCIN 20 MG/G
1 OINTMENT TOPICAL
Qty: 22 | Refills: 0
Start: 2024-02-12 | End: 2024-02-16

## 2024-02-14 ENCOUNTER — RX RENEWAL (OUTPATIENT)
Age: 54
End: 2024-02-14

## 2024-02-14 RX ORDER — LOSARTAN POTASSIUM 100 MG/1
100 TABLET, FILM COATED ORAL DAILY
Qty: 90 | Refills: 1 | Status: ACTIVE | COMMUNITY
Start: 2019-10-01 | End: 1900-01-01

## 2024-02-16 ENCOUNTER — APPOINTMENT (OUTPATIENT)
Dept: PULMONOLOGY | Facility: CLINIC | Age: 54
End: 2024-02-16
Payer: COMMERCIAL

## 2024-02-16 VITALS
SYSTOLIC BLOOD PRESSURE: 148 MMHG | WEIGHT: 185 LBS | OXYGEN SATURATION: 98 % | HEIGHT: 64 IN | HEART RATE: 104 BPM | BODY MASS INDEX: 31.58 KG/M2 | DIASTOLIC BLOOD PRESSURE: 90 MMHG

## 2024-02-16 DIAGNOSIS — Z01.811 ENCOUNTER FOR PREPROCEDURAL RESPIRATORY EXAMINATION: ICD-10-CM

## 2024-02-16 DIAGNOSIS — Z01.818 ENCOUNTER FOR OTHER PREPROCEDURAL EXAMINATION: ICD-10-CM

## 2024-02-16 DIAGNOSIS — L71.9 ROSACEA, UNSPECIFIED: ICD-10-CM

## 2024-02-16 DIAGNOSIS — J44.9 CHRONIC OBSTRUCTIVE PULMONARY DISEASE, UNSPECIFIED: ICD-10-CM

## 2024-02-16 DIAGNOSIS — I10 ESSENTIAL (PRIMARY) HYPERTENSION: ICD-10-CM

## 2024-02-16 PROCEDURE — 99214 OFFICE O/P EST MOD 30 MIN: CPT | Mod: 25

## 2024-02-16 PROCEDURE — 71046 X-RAY EXAM CHEST 2 VIEWS: CPT

## 2024-02-16 PROCEDURE — 94010 BREATHING CAPACITY TEST: CPT

## 2024-02-16 RX ORDER — HYDROCORTISONE 25 MG/G
2.5 OINTMENT TOPICAL TWICE DAILY
Qty: 20 | Refills: 3 | Status: ACTIVE | COMMUNITY
Start: 2024-02-16 | End: 1900-01-01

## 2024-02-19 RX ORDER — SODIUM CHLORIDE 9 MG/ML
1000 INJECTION, SOLUTION INTRAVENOUS
Refills: 0 | Status: DISCONTINUED | OUTPATIENT
Start: 2024-02-29 | End: 2024-03-01

## 2024-02-19 RX ORDER — MONTELUKAST 4 MG/1
10 TABLET, CHEWABLE ORAL DAILY
Refills: 0 | Status: DISCONTINUED | OUTPATIENT
Start: 2024-02-29 | End: 2024-03-01

## 2024-02-19 RX ORDER — HYDROMORPHONE HYDROCHLORIDE 2 MG/ML
0.5 INJECTION INTRAMUSCULAR; INTRAVENOUS; SUBCUTANEOUS
Refills: 0 | Status: DISCONTINUED | OUTPATIENT
Start: 2024-02-29 | End: 2024-03-01

## 2024-02-19 RX ORDER — MAGNESIUM HYDROXIDE 400 MG/1
30 TABLET, CHEWABLE ORAL DAILY
Refills: 0 | Status: DISCONTINUED | OUTPATIENT
Start: 2024-02-29 | End: 2024-03-01

## 2024-02-19 RX ORDER — PANTOPRAZOLE SODIUM 20 MG/1
40 TABLET, DELAYED RELEASE ORAL
Refills: 0 | Status: DISCONTINUED | OUTPATIENT
Start: 2024-02-29 | End: 2024-03-01

## 2024-02-19 RX ORDER — FOLIC ACID 0.8 MG
1 TABLET ORAL DAILY
Refills: 0 | Status: DISCONTINUED | OUTPATIENT
Start: 2024-02-29 | End: 2024-03-01

## 2024-02-19 RX ORDER — TAMOXIFEN CITRATE 20 MG/1
10 TABLET, FILM COATED ORAL DAILY
Refills: 0 | Status: DISCONTINUED | OUTPATIENT
Start: 2024-02-29 | End: 2024-03-01

## 2024-02-19 RX ORDER — ONDANSETRON 8 MG/1
4 TABLET, FILM COATED ORAL EVERY 6 HOURS
Refills: 0 | Status: DISCONTINUED | OUTPATIENT
Start: 2024-02-29 | End: 2024-03-01

## 2024-02-19 RX ORDER — TRAMADOL HYDROCHLORIDE 50 MG/1
50 TABLET ORAL EVERY 6 HOURS
Refills: 0 | Status: DISCONTINUED | OUTPATIENT
Start: 2024-02-29 | End: 2024-03-01

## 2024-02-19 RX ORDER — OXYCODONE HYDROCHLORIDE 5 MG/1
5 TABLET ORAL
Refills: 0 | Status: DISCONTINUED | OUTPATIENT
Start: 2024-02-29 | End: 2024-03-01

## 2024-02-19 RX ORDER — CELECOXIB 200 MG/1
200 CAPSULE ORAL EVERY 12 HOURS
Refills: 0 | Status: DISCONTINUED | OUTPATIENT
Start: 2024-02-29 | End: 2024-03-01

## 2024-02-19 RX ORDER — POLYETHYLENE GLYCOL 3350 17 G/17G
17 POWDER, FOR SOLUTION ORAL AT BEDTIME
Refills: 0 | Status: DISCONTINUED | OUTPATIENT
Start: 2024-02-29 | End: 2024-03-01

## 2024-02-19 RX ORDER — SENNA PLUS 8.6 MG/1
2 TABLET ORAL AT BEDTIME
Refills: 0 | Status: DISCONTINUED | OUTPATIENT
Start: 2024-02-29 | End: 2024-03-01

## 2024-02-19 RX ORDER — APIXABAN 2.5 MG/1
2.5 TABLET, FILM COATED ORAL EVERY 12 HOURS
Refills: 0 | Status: DISCONTINUED | OUTPATIENT
Start: 2024-03-01 | End: 2024-03-01

## 2024-02-19 RX ORDER — ACETAMINOPHEN 500 MG
1000 TABLET ORAL EVERY 8 HOURS
Refills: 0 | Status: DISCONTINUED | OUTPATIENT
Start: 2024-02-29 | End: 2024-03-01

## 2024-02-19 RX ORDER — OXYCODONE HYDROCHLORIDE 5 MG/1
10 TABLET ORAL
Refills: 0 | Status: DISCONTINUED | OUTPATIENT
Start: 2024-02-29 | End: 2024-03-01

## 2024-02-19 RX ORDER — ACETAMINOPHEN 500 MG
1000 TABLET ORAL ONCE
Refills: 0 | Status: DISCONTINUED | OUTPATIENT
Start: 2024-02-29 | End: 2024-03-01

## 2024-02-20 RX ORDER — LOSARTAN POTASSIUM 100 MG/1
1 TABLET, FILM COATED ORAL
Refills: 0 | DISCHARGE

## 2024-02-20 RX ORDER — LOSARTAN POTASSIUM 100 MG/1
100 TABLET, FILM COATED ORAL DAILY
Refills: 0 | Status: DISCONTINUED | OUTPATIENT
Start: 2024-02-29 | End: 2024-03-01

## 2024-02-20 RX ORDER — LORATADINE 10 MG/1
10 TABLET ORAL DAILY
Refills: 0 | Status: DISCONTINUED | OUTPATIENT
Start: 2024-02-29 | End: 2024-03-01

## 2024-02-22 DIAGNOSIS — I45.10 UNSPECIFIED RIGHT BUNDLE-BRANCH BLOCK: ICD-10-CM

## 2024-02-22 NOTE — PROCEDURE
[FreeTextEntry1] : Urine culture negative MRSA negative but positive staph Patient metabolic profile AST 65 with otherwise normal ALT Hemoglobin A1c 5.5 CBC normal range EKG incomplete right bundle branch block  Spirometry February 16, 2024 Flow rates are normal range  Chest x-ray PA lateral February 16, 2024 Preop study Cardiac size is normal Lung fields are clear No frontal infiltrate pleural effusion or dominant pulmonary nodules Soft tissue bony structures unremarkable   PFT no bronchodilator August 22, 2022 Flow rates normal Lung volumes normal TLC 84% predicted Noted decreased at RV 48% predicted likely secondary to patient increased abdominal girth weight Diffusion normal 81% predicted Hemoglobin 12.4 Data comparison to flow rates of March 28, 2022 demonstrates significant interval improvement There are some mild decline compared to data of July 9, 2021 at the TLC and diffusion  Spirometry no bronchodilator March 28, 2022 Flow rates normal Ratio 79 There is a greater than 200 cc decline at both the FEV1 FVC compared to data of July 9, 2021  Chest x-ray PA lateral March 28, 2022 Cardiac size normal Lung fields are clear No parenchymal infiltrates pleural effusions dominant pulmonary nodules Soft tissue bony structures unremarkable Jennifer mediastinum unremarkable Impression clear lungs Some technique differences there is no interval change compared to chest x-ray July 9, 2021  PFT without bronchodilator July 9, 2021 Flow rates are normal Lung volumes normal Total lung capacity 93% predicted Diffusion normal 95% predicted Stable pulmonary physiology Hemoglobin 11.2  Chest x-ray PA lateral  7/9/21  diagnosis breast cancer DCIS Cardiac size normal Clear lung fields without evidence of parenchymal infiltrates, pulmonary nodules, pleural effusions, mediastinal adenopathy. Soft tissue bony structures normal. Impression clear lungs  Spirometry August 21, 2019 Normal spirometry 50% response to bronchodilator at the FEV1

## 2024-02-22 NOTE — CONSULT LETTER
[Courtesy Letter:] : I had the pleasure of seeing your patient, [unfilled], in my office today. [Please see my note below.] : Please see my note below. [Sincerely,] : Sincerely, [___] : [unfilled] [FreeTextEntry3] : Sterling Carr D.O., RASHEED.  of Medicine [facility]

## 2024-02-22 NOTE — DISCUSSION/SUMMARY
[FreeTextEntry1] : PREOP ORTHO  Post COVID-19 infection history post viral bronchitis secondary to above with a component of small airways disease  ORTHO noted Lower extremity edema Resolved  recheck blood pressure avoid beta-blocker hold further calcium channel blocker- normal BP Check serum electrolytes CBC sedimentation rate reviewed Continue losartan 100 mg daily Add low-dose Norvasc 2.5 mg-discontinue secondary to lower extremity edema OFF Recheck blood pressure 3 month stable has  been off BREO  add symbicort 2 puffs BID and Rinse prn Notify of any wheezing.  Notify if rescue inhaler is needed greater than 2-3 times in any week.  Avoid known triggers.   diagnosis breast cancer DCIS sinusitis hx  Rx updated No evidence for active respiratory impairment Stable pulmonary physiology As needed short acting bronchodilator Notify of any wheezing.  Notify if rescue inhaler is needed greater than 2-3 times in any week.  Avoid known triggers. COVID-19 precautions Rheum consult  joint pains  Patient is medically pulmonary cleared for scheduled orthopedic surgery February 29, 2020 for right total knee replacement

## 2024-02-22 NOTE — PHYSICAL EXAM
[No Acute Distress] : no acute distress [Normal Appearance] : normal appearance [Normal Oropharynx] : normal oropharynx [No Neck Mass] : no neck mass [Normal Rate/Rhythm] : normal rate/rhythm [Normal S1, S2] : normal s1, s2 [No Murmurs] : no murmurs [No Resp Distress] : no resp distress [Clear to Auscultation Bilaterally] : clear to auscultation bilaterally [No Abnormalities] : no abnormalities [Benign] : benign [Normal Gait] : normal gait [No Clubbing] : no clubbing [No Cyanosis] : no cyanosis [No Edema] : no edema [FROM] : FROM [Normal Color/ Pigmentation] : normal color/ pigmentation [No Focal Deficits] : no focal deficits [Oriented x3] : oriented x3 [Normal Affect] : normal affect [TextBox_125] : Rosacea

## 2024-02-22 NOTE — HISTORY OF PRESENT ILLNESS
[Former] : former [TextBox_4] : Pending R total knee replacement Cardiology Dr Toro Kay  without  complications Up to date stress test EKG echocardiogram carotid studies No active respiratory symptoms No recent illness Denies shortness of breath chest pain chest tightness No cough wheeze   Post COVID-19 infection December 30, 2022 Patient did not receive treatment either oral antiviral or infusion Subsequently developed significant hacking poor sleep chest congestion increased mucus but no wheezing No fevers chills or sweats  [YearQuit] : 1999 [TextBox_11] : 2

## 2024-02-28 RX ORDER — SODIUM CHLORIDE 9 MG/ML
3 INJECTION INTRAMUSCULAR; INTRAVENOUS; SUBCUTANEOUS EVERY 8 HOURS
Refills: 0 | Status: DISCONTINUED | OUTPATIENT
Start: 2024-02-29 | End: 2024-03-01

## 2024-02-29 ENCOUNTER — APPOINTMENT (OUTPATIENT)
Dept: ORTHOPEDIC SURGERY | Facility: HOSPITAL | Age: 54
End: 2024-02-29

## 2024-02-29 ENCOUNTER — INPATIENT (INPATIENT)
Facility: HOSPITAL | Age: 54
LOS: 0 days | Discharge: HOME HEALTH | End: 2024-03-01
Attending: ORTHOPAEDIC SURGERY | Admitting: ORTHOPAEDIC SURGERY
Payer: COMMERCIAL

## 2024-02-29 ENCOUNTER — TRANSCRIPTION ENCOUNTER (OUTPATIENT)
Age: 54
End: 2024-02-29

## 2024-02-29 VITALS
DIASTOLIC BLOOD PRESSURE: 93 MMHG | OXYGEN SATURATION: 98 % | TEMPERATURE: 98 F | HEART RATE: 98 BPM | RESPIRATION RATE: 16 BRPM | SYSTOLIC BLOOD PRESSURE: 150 MMHG | HEIGHT: 64 IN | WEIGHT: 184.97 LBS

## 2024-02-29 DIAGNOSIS — Z98.890 OTHER SPECIFIED POSTPROCEDURAL STATES: Chronic | ICD-10-CM

## 2024-02-29 LAB
ANION GAP SERPL CALC-SCNC: 8 MMOL/L — SIGNIFICANT CHANGE UP (ref 5–17)
APTT BLD: 42.2 SEC — HIGH (ref 24.5–35.6)
BUN SERPL-MCNC: 18 MG/DL — SIGNIFICANT CHANGE UP (ref 7–23)
CALCIUM SERPL-MCNC: 8.3 MG/DL — LOW (ref 8.5–10.1)
CHLORIDE SERPL-SCNC: 111 MMOL/L — HIGH (ref 96–108)
CO2 SERPL-SCNC: 22 MMOL/L — SIGNIFICANT CHANGE UP (ref 22–31)
CREAT SERPL-MCNC: 0.61 MG/DL — SIGNIFICANT CHANGE UP (ref 0.5–1.3)
EGFR: 106 ML/MIN/1.73M2 — SIGNIFICANT CHANGE UP
GLUCOSE BLDC GLUCOMTR-MCNC: 131 MG/DL — HIGH (ref 70–99)
GLUCOSE SERPL-MCNC: 184 MG/DL — HIGH (ref 70–99)
HCT VFR BLD CALC: 37.1 % — SIGNIFICANT CHANGE UP (ref 34.5–45)
HGB BLD-MCNC: 12.2 G/DL — SIGNIFICANT CHANGE UP (ref 11.5–15.5)
INR BLD: 1.1 RATIO — SIGNIFICANT CHANGE UP (ref 0.85–1.18)
MCHC RBC-ENTMCNC: 31 PG — SIGNIFICANT CHANGE UP (ref 27–34)
MCHC RBC-ENTMCNC: 32.9 G/DL — SIGNIFICANT CHANGE UP (ref 32–36)
MCV RBC AUTO: 94.4 FL — SIGNIFICANT CHANGE UP (ref 80–100)
NRBC # BLD: 0 /100 WBCS — SIGNIFICANT CHANGE UP (ref 0–0)
PLATELET # BLD AUTO: 215 K/UL — SIGNIFICANT CHANGE UP (ref 150–400)
POTASSIUM SERPL-MCNC: 4 MMOL/L — SIGNIFICANT CHANGE UP (ref 3.5–5.3)
POTASSIUM SERPL-SCNC: 4 MMOL/L — SIGNIFICANT CHANGE UP (ref 3.5–5.3)
PROTHROM AB SERPL-ACNC: 13.2 SEC — HIGH (ref 9.5–13)
RBC # BLD: 3.93 M/UL — SIGNIFICANT CHANGE UP (ref 3.8–5.2)
RBC # FLD: 12.9 % — SIGNIFICANT CHANGE UP (ref 10.3–14.5)
SODIUM SERPL-SCNC: 141 MMOL/L — SIGNIFICANT CHANGE UP (ref 135–145)
WBC # BLD: 8.8 K/UL — SIGNIFICANT CHANGE UP (ref 3.8–10.5)
WBC # FLD AUTO: 8.8 K/UL — SIGNIFICANT CHANGE UP (ref 3.8–10.5)

## 2024-02-29 PROCEDURE — 73560 X-RAY EXAM OF KNEE 1 OR 2: CPT | Mod: 26,RT

## 2024-02-29 PROCEDURE — 27447 TOTAL KNEE ARTHROPLASTY: CPT | Mod: RT

## 2024-02-29 DEVICE — CEMENT PALACOS R: Type: IMPLANTABLE DEVICE | Site: RIGHT | Status: FUNCTIONAL

## 2024-02-29 DEVICE — FEM PERSONA PS CMT CCR STD SZ 8 R: Type: IMPLANTABLE DEVICE | Site: RIGHT | Status: FUNCTIONAL

## 2024-02-29 DEVICE — ZIMMER FEMALE HEX SCREW MAGNETIC 2.5MM X 25MM: Type: IMPLANTABLE DEVICE | Site: RIGHT | Status: FUNCTIONAL

## 2024-02-29 DEVICE — PATELLA  ALL POLY VE 32MM: Type: IMPLANTABLE DEVICE | Site: RIGHT | Status: FUNCTIONAL

## 2024-02-29 DEVICE — STEM EXT PERSONA 14MM PLUS 30M: Type: IMPLANTABLE DEVICE | Site: RIGHT | Status: FUNCTIONAL

## 2024-02-29 DEVICE — SURF ART PERSONA RT 6-9 EF 14MM: Type: IMPLANTABLE DEVICE | Site: RIGHT | Status: FUNCTIONAL

## 2024-02-29 DEVICE — ZIMMER/NEXGEN SMOOTH PIN 3.2X75MM: Type: IMPLANTABLE DEVICE | Site: RIGHT | Status: FUNCTIONAL

## 2024-02-29 DEVICE — ZIMMER/NEXGEN HEX HEAD SCREW 3.5MM: Type: IMPLANTABLE DEVICE | Site: RIGHT | Status: FUNCTIONAL

## 2024-02-29 DEVICE — STEM TIB PERSONA SZ E R 5 DEG: Type: IMPLANTABLE DEVICE | Site: RIGHT | Status: FUNCTIONAL

## 2024-02-29 RX ORDER — LOSARTAN POTASSIUM 100 MG/1
25 TABLET, FILM COATED ORAL DAILY
Refills: 0 | Status: DISCONTINUED | OUTPATIENT
Start: 2024-02-29 | End: 2024-02-29

## 2024-02-29 RX ORDER — ACETAMINOPHEN 500 MG
1000 TABLET ORAL ONCE
Refills: 0 | Status: COMPLETED | OUTPATIENT
Start: 2024-02-29 | End: 2024-02-29

## 2024-02-29 RX ORDER — MONTELUKAST 4 MG/1
1 TABLET, CHEWABLE ORAL
Refills: 0 | DISCHARGE

## 2024-02-29 RX ORDER — LORATADINE 10 MG/1
1 TABLET ORAL
Refills: 0 | DISCHARGE

## 2024-02-29 RX ORDER — CEFAZOLIN SODIUM 1 G
2000 VIAL (EA) INJECTION EVERY 8 HOURS
Refills: 0 | Status: COMPLETED | OUTPATIENT
Start: 2024-02-29 | End: 2024-03-01

## 2024-02-29 RX ORDER — CELECOXIB 200 MG/1
200 CAPSULE ORAL ONCE
Refills: 0 | Status: COMPLETED | OUTPATIENT
Start: 2024-02-29 | End: 2024-02-29

## 2024-02-29 RX ORDER — LANOLIN ALCOHOL/MO/W.PET/CERES
3 CREAM (GRAM) TOPICAL AT BEDTIME
Refills: 0 | Status: DISCONTINUED | OUTPATIENT
Start: 2024-02-29 | End: 2024-03-01

## 2024-02-29 RX ORDER — HYDROMORPHONE HYDROCHLORIDE 2 MG/ML
0.5 INJECTION INTRAMUSCULAR; INTRAVENOUS; SUBCUTANEOUS
Refills: 0 | Status: DISCONTINUED | OUTPATIENT
Start: 2024-02-29 | End: 2024-02-29

## 2024-02-29 RX ORDER — ONDANSETRON 8 MG/1
4 TABLET, FILM COATED ORAL ONCE
Refills: 0 | Status: DISCONTINUED | OUTPATIENT
Start: 2024-02-29 | End: 2024-02-29

## 2024-02-29 RX ORDER — TAMOXIFEN CITRATE 20 MG/1
1 TABLET, FILM COATED ORAL
Refills: 0 | DISCHARGE

## 2024-02-29 RX ORDER — ACETAMINOPHEN 500 MG
650 TABLET ORAL ONCE
Refills: 0 | Status: COMPLETED | OUTPATIENT
Start: 2024-02-29 | End: 2024-02-29

## 2024-02-29 RX ORDER — LOSARTAN POTASSIUM 100 MG/1
1 TABLET, FILM COATED ORAL
Refills: 0 | DISCHARGE

## 2024-02-29 RX ORDER — SODIUM CHLORIDE 9 MG/ML
1000 INJECTION, SOLUTION INTRAVENOUS
Refills: 0 | Status: DISCONTINUED | OUTPATIENT
Start: 2024-02-29 | End: 2024-02-29

## 2024-02-29 RX ORDER — OMEPRAZOLE 10 MG/1
1 CAPSULE, DELAYED RELEASE ORAL
Refills: 0 | DISCHARGE

## 2024-02-29 RX ORDER — FLUTICASONE PROPIONATE AND SALMETEROL 50; 250 UG/1; UG/1
1 POWDER ORAL; RESPIRATORY (INHALATION)
Refills: 0 | DISCHARGE

## 2024-02-29 RX ADMIN — Medication 3 MILLIGRAM(S): at 21:43

## 2024-02-29 RX ADMIN — LORATADINE 10 MILLIGRAM(S): 10 TABLET ORAL at 17:43

## 2024-02-29 RX ADMIN — HYDROMORPHONE HYDROCHLORIDE 0.5 MILLIGRAM(S): 2 INJECTION INTRAMUSCULAR; INTRAVENOUS; SUBCUTANEOUS at 15:20

## 2024-02-29 RX ADMIN — HYDROMORPHONE HYDROCHLORIDE 0.5 MILLIGRAM(S): 2 INJECTION INTRAMUSCULAR; INTRAVENOUS; SUBCUTANEOUS at 15:05

## 2024-02-29 RX ADMIN — HYDROMORPHONE HYDROCHLORIDE 0.5 MILLIGRAM(S): 2 INJECTION INTRAMUSCULAR; INTRAVENOUS; SUBCUTANEOUS at 16:14

## 2024-02-29 RX ADMIN — SODIUM CHLORIDE 75 MILLILITER(S): 9 INJECTION, SOLUTION INTRAVENOUS at 14:40

## 2024-02-29 RX ADMIN — OXYCODONE HYDROCHLORIDE 10 MILLIGRAM(S): 5 TABLET ORAL at 21:41

## 2024-02-29 RX ADMIN — CELECOXIB 200 MILLIGRAM(S): 200 CAPSULE ORAL at 18:44

## 2024-02-29 RX ADMIN — HYDROMORPHONE HYDROCHLORIDE 0.5 MILLIGRAM(S): 2 INJECTION INTRAMUSCULAR; INTRAVENOUS; SUBCUTANEOUS at 14:40

## 2024-02-29 RX ADMIN — Medication 1000 MILLIGRAM(S): at 17:44

## 2024-02-29 RX ADMIN — HYDROMORPHONE HYDROCHLORIDE 0.5 MILLIGRAM(S): 2 INJECTION INTRAMUSCULAR; INTRAVENOUS; SUBCUTANEOUS at 15:30

## 2024-02-29 RX ADMIN — HYDROMORPHONE HYDROCHLORIDE 0.5 MILLIGRAM(S): 2 INJECTION INTRAMUSCULAR; INTRAVENOUS; SUBCUTANEOUS at 16:03

## 2024-02-29 RX ADMIN — HYDROMORPHONE HYDROCHLORIDE 0.5 MILLIGRAM(S): 2 INJECTION INTRAMUSCULAR; INTRAVENOUS; SUBCUTANEOUS at 14:55

## 2024-02-29 RX ADMIN — Medication 650 MILLIGRAM(S): at 10:09

## 2024-02-29 RX ADMIN — OXYCODONE HYDROCHLORIDE 10 MILLIGRAM(S): 5 TABLET ORAL at 17:44

## 2024-02-29 RX ADMIN — CELECOXIB 200 MILLIGRAM(S): 200 CAPSULE ORAL at 10:09

## 2024-02-29 RX ADMIN — LOSARTAN POTASSIUM 100 MILLIGRAM(S): 100 TABLET, FILM COATED ORAL at 17:43

## 2024-02-29 RX ADMIN — OXYCODONE HYDROCHLORIDE 10 MILLIGRAM(S): 5 TABLET ORAL at 18:44

## 2024-02-29 RX ADMIN — SODIUM CHLORIDE 75 MILLILITER(S): 9 INJECTION, SOLUTION INTRAVENOUS at 17:44

## 2024-02-29 RX ADMIN — Medication 400 MILLIGRAM(S): at 16:51

## 2024-02-29 RX ADMIN — HYDROMORPHONE HYDROCHLORIDE 0.5 MILLIGRAM(S): 2 INJECTION INTRAMUSCULAR; INTRAVENOUS; SUBCUTANEOUS at 15:45

## 2024-02-29 RX ADMIN — Medication 100 MILLIGRAM(S): at 20:43

## 2024-02-29 RX ADMIN — OXYCODONE HYDROCHLORIDE 10 MILLIGRAM(S): 5 TABLET ORAL at 22:30

## 2024-02-29 RX ADMIN — CELECOXIB 200 MILLIGRAM(S): 200 CAPSULE ORAL at 17:43

## 2024-02-29 NOTE — ASU PATIENT PROFILE, ADULT - FALL HARM RISK - UNIVERSAL INTERVENTIONS
Bed in lowest position, wheels locked, appropriate side rails in place/Call bell, personal items and telephone in reach/Instruct patient to call for assistance before getting out of bed or chair/Non-slip footwear when patient is out of bed/[place] to call system/Physically safe environment - no spills, clutter or unnecessary equipment/Purposeful Proactive Rounding/Room/bathroom lighting operational, light cord in reach

## 2024-02-29 NOTE — PHYSICAL THERAPY INITIAL EVALUATION ADULT - RANGE OF MOTION EXAMINATION, REHAB EVAL
R knee decreased ROM secondary to post surgical pain and inflammation./bilateral upper extremity ROM was WFL (within functional limits)/Left LE ROM was WFL (within functional limits)/deficits as listed below

## 2024-02-29 NOTE — DISCHARGE NOTE PROVIDER - NSDCFUSCHEDAPPT_GEN_ALL_CORE_FT
Igor Tristan [facility]  ORTHOSURG [address]  Scheduled Appointment: 02/29/2024    Igor Tristan [facility]  ORTHOSURLASHAY [address]  Scheduled Appointment: 03/22/2024     Igor Tristan  [facility]  ORTHOSUR [address]  Scheduled Appointment: 03/22/2024

## 2024-02-29 NOTE — PATIENT PROFILE ADULT - FALL HARM RISK - HARM RISK INTERVENTIONS
Assistance with ambulation/Assistance OOB with selected safe patient handling equipment/Communicate Risk of Fall with Harm to all staff/Discuss with provider need for PT consult/Monitor gait and stability/Provide patient with walking aids - walker, cane, crutches/Reinforce activity limits and safety measures with patient and family/Review medications for side effects contributing to fall risk/Sit up slowly, dangle for a short time, stand at bedside before walking/Tailored Fall Risk Interventions/Toileting schedule using arm’s reach rule for commode and bathroom/Use of alarms - bed, chair and/or voice tab/Visual Cue: Yellow wristband and red socks/Bed in lowest position, wheels locked, appropriate side rails in place/Call bell, personal items and telephone in reach/Instruct patient to call for assistance before getting out of bed or chair/Non-slip footwear when patient is out of bed/[place] to call system/Physically safe environment - no spills, clutter or unnecessary equipment/Purposeful Proactive Rounding/Room/bathroom lighting operational, light cord in reach

## 2024-02-29 NOTE — PATIENT PROFILE ADULT - PHONE #
Patient is calling back stating [facility] is requesting results of MRI to be fax to [phone] today. Please advise   [phone]

## 2024-02-29 NOTE — PHYSICAL THERAPY INITIAL EVALUATION ADULT - LEVEL OF INDEPENDENCE, REHAB EVAL
Hyperopia-Discussed diagnosis with patient. Updated spec Rx given. Recommend lens that will provide comfort as well as protect safety and health of eyes. supervision

## 2024-02-29 NOTE — PATIENT PROFILE ADULT - NSPROLASTMENSTRUAL_GEN_A_NUR
Nilson Islas is a 46 y.o. female , established patient, here for evaluation of the following chief complaint(s):    HPI  Chief Complaint   Patient presents with    Medication Refill    Skin Problem     boils located under her breast    Fatigue     wants iron levels checked       1. Depression, unspecified depression type  Patient is feeling sad and tearful. Reports getting stressed due to having to take care of her father who has dementia. Patient is unable to sleep at night because he calls for her all night long. Patient is requesting psychotherapy referral.  She declined starting any medication today. Denies suicidal homicidal ideation. 2. Screen for colon cancer  Not up-to-date on colonoscopy. Second referral given. 3. Hypertension, unspecified type  Requests refill of metoprolol. Blood pressure stable. 4. Vitamin D deficiency  Check labs. Currently taking vitamin D supplement. 5. Iron deficiency anemia, unspecified iron deficiency anemia type  Check iron levels. Currently taking iron supplement. Was to increase iron supplement twice a day. Currently gets constipation due to taking iron supplement. Takes MiraLAX with it. 6. Fatigue, unspecified type  Patient reports generalized fatigue. Wants to check labs. Denies fever, chills, body aches, cough. Not up-to-date on Covid vaccine. I counseled her to get vaccine. 7. Hyperlipidemia, unspecified hyperlipidemia type    Patient presents today for hyperlipidemia. Patient currently taking Crestor. Taking medication as prescribed without side effects. Trying to follow a low cholesterol diet.  Exercising none  Skips doses of meds: None    Lab Results   Component Value Date/Time    Cholesterol, total 240 (H) 01/21/2021 10:59 AM    HDL Cholesterol 41 01/21/2021 10:59 AM    LDL, calculated 165.4 (H) 01/21/2021 10:59 AM    VLDL, calculated 33.6 01/21/2021 10:59 AM    Triglyceride 168 (H) 01/21/2021 10:59 AM    CHOL/HDL Ratio 5.9 (H) 01/21/2021 10:59 AM       8. Impaired fasting blood sugar  Check HbA1c. Last HbA1c 5.9.    9. Boil  Patient reports recurrent boils under bilateral breasts. Currently using nystatin powder to keep area dry. Was previously treated with doxycycline with improved symptoms. Requests prescription for Doxy. Review of Systems   Constitutional: Positive for malaise/fatigue. HENT: Negative. Eyes: Negative. Respiratory: Negative. Cardiovascular: Negative. Gastrointestinal: Positive for constipation. Genitourinary: Negative. Musculoskeletal: Negative. Skin: Negative. Neurological: Negative. Endo/Heme/Allergies: Negative. Psychiatric/Behavioral: Negative. Physical Exam  Vitals and nursing note reviewed. HENT:      Head: Normocephalic and atraumatic. Nose: Nose normal.   Eyes:      Conjunctiva/sclera: Conjunctivae normal.   Neck:      Thyroid: No thyromegaly. Cardiovascular:      Rate and Rhythm: Normal rate and regular rhythm. Pulmonary:      Effort: Pulmonary effort is normal.      Breath sounds: Normal breath sounds. Abdominal:      Palpations: Abdomen is soft. Musculoskeletal:         General: Normal range of motion. Cervical back: Normal range of motion and neck supple. Lymphadenopathy:      Cervical: No cervical adenopathy. Skin:     General: Skin is warm and dry. Findings: Rash present. Rash is pustular. Neurological:      Mental Status: She is alert and oriented to person, place, and time. Psychiatric:         Mood and Affect: Mood is depressed. Affect is tearful.        /86 (BP 1 Location: Right arm, BP Patient Position: Sitting, BP Cuff Size: Large adult)   Pulse 84   Temp 97.3 °F (36.3 °C) (Temporal)   Ht 5' 4\" (1.626 m)   Wt 198 lb (89.8 kg)   SpO2 99%   BMI 33.99 kg/m²     No Known Allergies    Current Outpatient Medications   Medication Sig    metoprolol tartrate (LOPRESSOR) 50 mg tablet TAKE 1 TABLET BY MOUTH  DAILY    doxycycline (VIBRAMYCIN) 100 mg capsule Take 1 Capsule by mouth two (2) times a day for 10 days.  hydrOXYzine HCL (ATARAX) 50 mg tablet TAKE 1 TABLET BY MOUTH EVERY DAY AS NEEDED    nystatin (MYCOSTATIN) powder Apply  to affected area four (4) times daily.  hydroCHLOROthiazide (HYDRODIURIL) 25 mg tablet TAKE 1 TABLET BY MOUTH EVERY DAY    cholecalciferol (Vitamin D3) (5000 Units/125 mcg) tab tablet Take 1 Tab by mouth daily.  ferrous sulfate 325 mg (65 mg iron) tablet TAKE 1 TABLET BY MOUTH EVERY DAY    rosuvastatin (CRESTOR) 10 mg tablet Take 1 Tab by mouth nightly. (Patient not taking: Reported on 5/20/2021)    senna-docusate (PERICOLACE) 8.6-50 mg per tablet Take 1 Tab by mouth daily. (Patient not taking: Reported on 5/20/2021)     No current facility-administered medications for this visit.        Past Medical History:   Diagnosis Date    Abdominal pain, other specified site     Back pain     Cholelithiasis 12/6/2012    Constipation     Depression     Frequent headaches     Headache(784.0)     Hypertension     Migraine     Muscle pain     Renal carcinoma, left ([facility] [address] 2011    partial left kidney resection    Snoring     TIA (transient ischemic attack) 06/2013    patient reported       Past Surgical History:   Procedure Laterality Date    HX LAP CHOLECYSTECTOMY  12-28-12    by Dr. Juanita Macdonald  10/1/11    left kidney partial per patient       Problem List  Date Reviewed: 1/21/2021        Codes Class Noted    Hypertension (Chronic) ICD-10-CM: I10  ICD-9-CM: 401.9  2/20/2019        TIA due to embolism [facility]) ICD-10-CM: G45.9, I74.9  ICD-9-CM: 435.9, 444.9  6/1/2013    Overview Signed 4/2/2019  8:42 AM by Mariam Riggins MD     patient reported             Cholelithiasis ICD-10-CM: K80.20  ICD-9-CM: 574.20  12/6/2012        Renal carcinoma, left (HCC) ICD-10-CM: C64.2  ICD-9-CM: 189.0  1/1/2011    Overview Signed 4/2/2019  8:40 AM by Mariam Riggins MD     partial left kidney resection                    No results found for this visit on 05/20/21. 1. Depression, unspecified depression type    - REFERRAL TO PSYCHIATRY  - THYROID CASCADE PROFILE; Future  - CBC WITH AUTOMATED DIFF; Future    2. Screen for colon cancer    - REFERRAL TO GASTROENTEROLOGY    3. Hypertension, unspecified type    - metoprolol tartrate (LOPRESSOR) 50 mg tablet; TAKE 1 TABLET BY MOUTH  DAILY  Dispense: 90 Tablet; Refill: 1  - THYROID CASCADE PROFILE; Future  - METABOLIC PANEL, COMPREHENSIVE; Future    4. Vitamin D deficiency    - VITAMIN D, 25 HYDROXY; Future    5. Iron deficiency anemia, unspecified iron deficiency anemia type    - CBC WITH AUTOMATED DIFF; Future  - IRON PROFILE; Future    6. Fatigue, unspecified type    - THYROID CASCADE PROFILE; Future  - URINALYSIS W/ REFLEX CULTURE; Future  - CBC WITH AUTOMATED DIFF; Future  - METABOLIC PANEL, COMPREHENSIVE; Future    7. Hyperlipidemia, unspecified hyperlipidemia type    - METABOLIC PANEL, COMPREHENSIVE; Future  - LIPID PANEL; Future    8. Impaired fasting blood sugar    - HEMOGLOBIN A1C WITH EAG; Future  - URINALYSIS W/ REFLEX CULTURE; Future  - MICROALBUMIN, UR, RAND W/ MICROALB/CREAT RATIO; Future    9. Boil    - CBC WITH AUTOMATED DIFF; Future  - doxycycline (VIBRAMYCIN) 100 mg capsule; Take 1 Capsule by mouth two (2) times a day for 10 days. Dispense: 20 Capsule; Refill: 0        Follow-up and Dispositions    · Return in about 1 month (around 6/20/2021), or if symptoms worsen or fail to improve. I ADVISED PATIENT TO GO TO ER IF SYMPTOMS WORSEN , CHANGE OR FAILS TO IMPROVE. I have discussed the diagnosis with the patient and the intended plan as seen in the above orders. The patient has received an after-visit summary and questions were answered concerning future plans. I have discussed medication side effects and warnings with the patient as well. The patient agrees and understands above plan.            Latonia Conley, MD 3 years ago

## 2024-02-29 NOTE — DISCHARGE NOTE PROVIDER - CARE PROVIDER_API CALL
Igor Tristan.  Orthopaedic Surgery  [address], [address]  [place] [address]  Phone: [phone]  Fax: [phone]  Follow Up Time:

## 2024-02-29 NOTE — PHYSICAL THERAPY INITIAL EVALUATION ADULT - GENERAL OBSERVATIONS, REHAB EVAL
Pt encountered semi-fowlers in bed w/ partner at bedside, NAD, AxOx4, +surgical dressing (clean and intact), +hemovac, +SCD; and reports 3/10 pain.

## 2024-02-29 NOTE — PHYSICAL THERAPY INITIAL EVALUATION ADULT - ADDITIONAL COMMENTS
Per OT pre-op assessment: Pt lives with partner (Who can assist post op) in a private house with 1 threshold step (can fit walker) to enter. Once inside, the pt has 1 step (can fit walker) and then has 12 steps (Bilateral handrails half way up and then R handed all the way up) to reach main floor where the bedroom and bathroom is. The pts bathroom has a walk in shower stall, fixed/retractable shower head, standard toilet seat and no grab bars. The pt ambulates with no device and does not own any device for ambulation. The pt daily pain is a 4-5/10 at rest and a 8/10 with movement. The pt manages the pain with rest and Advil. The pt has no recent outpatient PT, no recent falls and has buckling of the knees. The pt wears glasses all the time, R handed, drives and has no hearing impairments

## 2024-02-29 NOTE — PHYSICAL THERAPY INITIAL EVALUATION ADULT - STRENGTHENING, PT EVAL
Pt will improve general strength enough to improve transfer, stair, and gait efficiency within x4 weeks.

## 2024-02-29 NOTE — ASU PATIENT PROFILE, ADULT - NS TRANSFER EYEGLASSES PAIRS
Call 911 for stroke/Need for follow up after discharge/Prescribed medications/Risk factors for stroke/Stroke education booklet/Stroke support groups for patients, families, and friends/Stroke warning signs and symptoms/Signs and symptoms of stroke 1 pair

## 2024-02-29 NOTE — PHYSICAL THERAPY INITIAL EVALUATION ADULT - TRANSFER SAFETY CONCERNS NOTED: SIT/STAND, REHAB EVAL
no
losing balance/decreased sequencing ability/decreased step length/decreased weight-shifting ability

## 2024-02-29 NOTE — PHYSICAL THERAPY INITIAL EVALUATION ADULT - GAIT DEVIATIONS NOTED, PT EVAL
decreased elva/increased time in double stance/decreased step length/decreased stride length/decreased weight-shifting ability

## 2024-02-29 NOTE — DISCHARGE NOTE PROVIDER - NSDCFUADDINST_GEN_ALL_CORE_FT
1.	Pain Control PRN, pain medications were sent to your pharmacy, please pick them up on your way home  2.	Walking with full weight bearing as tolerated, with assistive devices (walker/Cane as Needed)  3.	DVT Prophylaxis, Eliquis 2.5mg BID for 14 days. Day 15 start Aspirin 81 mg twice a day for 30 days. Do NOT skip doses.  4.	PT as needed  5.	Please call the office and make an appointment for suture/staple removal on post operative day 21. [phone]  6.	Remove Dressing Post-Op Day 10-14, with Dry dressing and Daily Dressing Changes as Need for saturation / strike through. Please keep clean dry and intact; If prevena in place, please remove day 5-7, replace with aquacel dressing  7.	Ice/Elevate affected area as Needed  8.	Keep Dressing Clean and dry.  9.     OK to shower w/ aquacel, DO NOT Submerge. No direct water contact. OK to wrap with Siran wrap for added protection

## 2024-02-29 NOTE — DISCHARGE NOTE PROVIDER - NSDCMRMEDTOKEN_GEN_ALL_CORE_FT
Claritin 10 mg oral tablet: 1 tab(s) orally once a day  losartan 100 mg oral tablet: 1 tab(s) orally once a day  mupirocin 2% topical ointment: Apply topically to affected area 2 times a day  PriLOSEC 20 mg oral delayed release capsule: 1 cap(s) orally once a day  Singulair 10 mg oral tablet: 1 tab(s) orally once a day  tamoxifen 10 mg oral tablet: 1 tab(s) orally once a day   Advair Diskus 100 mcg-50 mcg inhalation powder: 1 inhaled  apixaban 2.5 mg oral tablet: 1 tab(s) orally 2 times a day MDD: 2 tabs  aspirin 81 mg oral delayed release tablet: 1 tab(s) orally 2 times a day Take after completing Eliquis MDD: 2  CeleBREX 200 mg oral capsule: 1 cap(s) orally once a day  Claritin 10 mg oral tablet: 1 tab(s) orally once a day  Colace 100 mg oral capsule: 1 cap(s) orally every 8 hours as needed for  constipation MDD: 3  losartan 100 mg oral tablet: 1 tab(s) orally once a day  ondansetron 4 mg oral tablet: 1 tab(s) orally every 8 hours as needed for  nausea MDD: 3  oxyCODONE 5 mg oral tablet: 1 tab(s) orally every 6 hours as needed for  severe pain MDD: 4  PriLOSEC 20 mg oral delayed release capsule: 1 cap(s) orally once a day  Singulair 10 mg oral tablet: 1 tab(s) orally once a day  tamoxifen 10 mg oral tablet: 1 tab(s) orally once a day

## 2024-02-29 NOTE — DISCHARGE NOTE PROVIDER - NSDCCPCAREPLAN_GEN_ALL_CORE_FT
PRINCIPAL DISCHARGE DIAGNOSIS  Diagnosis: Arthritis of right knee  Assessment and Plan of Treatment:

## 2024-02-29 NOTE — CONSULT NOTE ADULT - SUBJECTIVE AND OBJECTIVE BOX
Chief Complaint:  Patient is a 53y old  Female who presents with a chief complaint of R TKA (29 Feb 2024 07:24)      HPI: Currently no sob or chest pain or palpitation. No current cough or fever . No current nausea or vomiting. Voiding. Pain controlled.      Review of Systems:    General:  No wt loss, fevers, chills, night sweats  Eyes:  Good vision, no reported pain  ENT:  No sore throat, pain, runny nose, dysphagia  CV:  No pain, palpitations, hypo/hypertension  Resp:  No dyspnea, cough, tachypnea, wheezing  GI:  No pain, nausea, vomiting, diarrhea, constipation  :  No pain, bleeding, incontinence, nocturia  Muscle:  No pain, weakness  Breast:  No pain, abscess, mass, discharge  Neuro:  No weakness, tingling, memory problems  Psych:  No fatigue, insomnia, mood problems, depression  Endocrine:  No polyuria, polydypsia, cold/heat intolerance  Heme:  No petechiae, ecchymosis, easy bruisability  Skin:  No rash, tattoos, scars, edema    Relevant Family History: NC. Allergy : NKDA      Relevant Social History: Quit Smoking.    PMH : OA. HTN. Seborrhea . H/O Ca Breast left with Lumpectomy. Meds : Reviewed.    Physical Exam:      Vital Signs:  Vital Signs Last 24 Hrs  T(C): 36.7 (29 Feb 2024 16:30), Max: 36.8 (29 Feb 2024 09:44)  T(F): 98.1 (29 Feb 2024 16:30), Max: 98.2 (29 Feb 2024 09:44)  HR: 88 (29 Feb 2024 16:42) (71 - 98)  BP: 149/90 (29 Feb 2024 16:42) (137/93 - 159/87)  BP(mean): --  RR: 20 (29 Feb 2024 16:42) (12 - 20)  SpO2: 97% (29 Feb 2024 16:42) (94% - 100%)    Parameters below as of 29 Feb 2024 16:42  Patient On (Oxygen Delivery Method): room air      Daily Height in cm: 162.56 (29 Feb 2024 09:44)    Daily   I&O's Summary    29 Feb 2024 07:01  -  29 Feb 2024 17:30  --------------------------------------------------------  IN: 0 mL / OUT: 125 mL / NET: -125 mL        General:  Appears stated age, well-groomed, well-nourished, no distress  HEENT:  NC/AT, patent nares w/ pink mucosa, OP clear w/o lesions, PERRL, EOMI, conjunctivae clear, no thyromegaly, nodules, adenopathy, no JVD  Chest:  Full & symmetric excursion, no increased effort, breath sounds clear  Cardiovascular:  Regular rhythm, S1, S2, no murmur/rub/S3/S4, no carotid/femoral/abdominal bruit, radial/pedal pulses 2+, no edema  Abdomen:  Soft, non-tender, non-distended, normoactive bowel sounds, no HSM  Extremities: + Drain right knee. + Pulse.  Skin:  No rash/erythema/ecchymoses/petechiae/wounds/abscess/warm/dry  Musculoskeletal: Intact.  Neuro/Psych:  Alert, oriented, normal and symmetric strength in UEs, LEs .    Laboratory:                            12.2   8.80  )-----------( 215      ( 29 Feb 2024 14:32 )             37.1     02-29    141  |  111<H>  |  18  ----------------------------<  184<H>  4.0   |  22  |  0.61    Ca    8.3<L>      29 Feb 2024 14:32            CAPILLARY BLOOD GLUCOSE      POCT Blood Glucose.: 131 mg/dL (29 Feb 2024 09:37)      PT/INR - ( 29 Feb 2024 09:35 )   PT: 13.2 sec;   INR: 1.10 ratio         PTT - ( 29 Feb 2024 09:35 )  PTT:42.2 sec  Urinalysis Basic - ( 29 Feb 2024 14:32 )    Color: x / Appearance: x / SG: x / pH: x  Gluc: 184 mg/dL / Ketone: x  / Bili: x / Urobili: x   Blood: x / Protein: x / Nitrite: x   Leuk Esterase: x / RBC: x / WBC x   Sq Epi: x / Non Sq Epi: x / Bacteria: x        Imaging:      Assessment: S/P right knee replacement. HTN .      Plan: PT/OT. Pain control. DVT prophylaxis. Incentive spirometry. Current meds reviewed.

## 2024-02-29 NOTE — DISCHARGE NOTE PROVIDER - HOSPITAL COURSE
Hospital Course:  The patient is a 53y Female  status post elective Total Knee Arthroplasty to the R knee after failing outpatient nonoperative conservative management. Patient presented to [facility] after being medically cleared for an elective surgical procedure. The patient was taken to the operating room on date mentioned above. Prophylactic antibiotics were started before the procedure and continued for 24 hours. There were no complications during the procedure and patient tolerated the procedure well. The patient was transferred to the recovery room in stable condition and subsequently to the surgical floor. The patient was placed on Eliquis 2.5mg BID for anticoagulation while in house to continue until POD 14, then starting POD 15 patient instructed to start ASA 81mg PO BID for a total of 30 days. All home medications were continued. The patient received physical therapy daily and daily labs were followed. The hemovac drain was DC'd on POD #1. The dressing was kept clean, dry, intact. The rest of the hospital stay was unremarkable.

## 2024-03-01 ENCOUNTER — TRANSCRIPTION ENCOUNTER (OUTPATIENT)
Age: 54
End: 2024-03-01

## 2024-03-01 VITALS
RESPIRATION RATE: 18 BRPM | HEART RATE: 87 BPM | SYSTOLIC BLOOD PRESSURE: 127 MMHG | TEMPERATURE: 98 F | OXYGEN SATURATION: 98 % | DIASTOLIC BLOOD PRESSURE: 71 MMHG

## 2024-03-01 LAB
ANION GAP SERPL CALC-SCNC: 11 MMOL/L — SIGNIFICANT CHANGE UP (ref 5–17)
BUN SERPL-MCNC: 15 MG/DL — SIGNIFICANT CHANGE UP (ref 7–23)
CALCIUM SERPL-MCNC: 7.6 MG/DL — LOW (ref 8.5–10.1)
CHLORIDE SERPL-SCNC: 108 MMOL/L — SIGNIFICANT CHANGE UP (ref 96–108)
CO2 SERPL-SCNC: 26 MMOL/L — SIGNIFICANT CHANGE UP (ref 22–31)
CREAT SERPL-MCNC: 0.53 MG/DL — SIGNIFICANT CHANGE UP (ref 0.5–1.3)
EGFR: 109 ML/MIN/1.73M2 — SIGNIFICANT CHANGE UP
GLUCOSE SERPL-MCNC: 138 MG/DL — HIGH (ref 70–99)
HCT VFR BLD CALC: 32.7 % — LOW (ref 34.5–45)
HGB BLD-MCNC: 10.6 G/DL — LOW (ref 11.5–15.5)
MCHC RBC-ENTMCNC: 31.3 PG — SIGNIFICANT CHANGE UP (ref 27–34)
MCHC RBC-ENTMCNC: 32.4 G/DL — SIGNIFICANT CHANGE UP (ref 32–36)
MCV RBC AUTO: 96.5 FL — SIGNIFICANT CHANGE UP (ref 80–100)
NRBC # BLD: 0 /100 WBCS — SIGNIFICANT CHANGE UP (ref 0–0)
PLATELET # BLD AUTO: 207 K/UL — SIGNIFICANT CHANGE UP (ref 150–400)
POTASSIUM SERPL-MCNC: 3.9 MMOL/L — SIGNIFICANT CHANGE UP (ref 3.5–5.3)
POTASSIUM SERPL-SCNC: 3.9 MMOL/L — SIGNIFICANT CHANGE UP (ref 3.5–5.3)
RBC # BLD: 3.39 M/UL — LOW (ref 3.8–5.2)
RBC # FLD: 12.9 % — SIGNIFICANT CHANGE UP (ref 10.3–14.5)
SODIUM SERPL-SCNC: 145 MMOL/L — SIGNIFICANT CHANGE UP (ref 135–145)
WBC # BLD: 10.35 K/UL — SIGNIFICANT CHANGE UP (ref 3.8–10.5)
WBC # FLD AUTO: 10.35 K/UL — SIGNIFICANT CHANGE UP (ref 3.8–10.5)

## 2024-03-01 RX ORDER — CELECOXIB 200 MG/1
1 CAPSULE ORAL
Qty: 30 | Refills: 0
Start: 2024-03-01 | End: 2024-03-30

## 2024-03-01 RX ORDER — OXYCODONE HYDROCHLORIDE 5 MG/1
1 TABLET ORAL
Qty: 28 | Refills: 0
Start: 2024-03-01 | End: 2024-03-07

## 2024-03-01 RX ORDER — ONDANSETRON 8 MG/1
1 TABLET, FILM COATED ORAL
Qty: 21 | Refills: 0
Start: 2024-03-01 | End: 2024-03-07

## 2024-03-01 RX ORDER — DOCUSATE SODIUM 100 MG
1 CAPSULE ORAL
Qty: 21 | Refills: 0
Start: 2024-03-01 | End: 2024-03-07

## 2024-03-01 RX ORDER — APIXABAN 2.5 MG/1
1 TABLET, FILM COATED ORAL
Qty: 28 | Refills: 0
Start: 2024-03-01 | End: 2024-03-14

## 2024-03-01 RX ORDER — ASPIRIN/CALCIUM CARB/MAGNESIUM 324 MG
1 TABLET ORAL
Qty: 60 | Refills: 0
Start: 2024-03-01 | End: 2024-03-30

## 2024-03-01 RX ADMIN — Medication 1 TABLET(S): at 11:59

## 2024-03-01 RX ADMIN — CELECOXIB 200 MILLIGRAM(S): 200 CAPSULE ORAL at 06:15

## 2024-03-01 RX ADMIN — SODIUM CHLORIDE 3 MILLILITER(S): 9 INJECTION INTRAMUSCULAR; INTRAVENOUS; SUBCUTANEOUS at 06:33

## 2024-03-01 RX ADMIN — OXYCODONE HYDROCHLORIDE 10 MILLIGRAM(S): 5 TABLET ORAL at 05:00

## 2024-03-01 RX ADMIN — Medication 100 MILLIGRAM(S): at 04:05

## 2024-03-01 RX ADMIN — APIXABAN 2.5 MILLIGRAM(S): 2.5 TABLET, FILM COATED ORAL at 07:42

## 2024-03-01 RX ADMIN — OXYCODONE HYDROCHLORIDE 10 MILLIGRAM(S): 5 TABLET ORAL at 11:59

## 2024-03-01 RX ADMIN — SODIUM CHLORIDE 3 MILLILITER(S): 9 INJECTION INTRAMUSCULAR; INTRAVENOUS; SUBCUTANEOUS at 00:17

## 2024-03-01 RX ADMIN — LORATADINE 10 MILLIGRAM(S): 10 TABLET ORAL at 11:59

## 2024-03-01 RX ADMIN — MONTELUKAST 10 MILLIGRAM(S): 4 TABLET, CHEWABLE ORAL at 11:59

## 2024-03-01 RX ADMIN — Medication 1000 MILLIGRAM(S): at 05:34

## 2024-03-01 RX ADMIN — OXYCODONE HYDROCHLORIDE 10 MILLIGRAM(S): 5 TABLET ORAL at 12:50

## 2024-03-01 RX ADMIN — LOSARTAN POTASSIUM 100 MILLIGRAM(S): 100 TABLET, FILM COATED ORAL at 05:33

## 2024-03-01 RX ADMIN — OXYCODONE HYDROCHLORIDE 10 MILLIGRAM(S): 5 TABLET ORAL at 04:04

## 2024-03-01 RX ADMIN — Medication 1 MILLIGRAM(S): at 11:59

## 2024-03-01 RX ADMIN — PANTOPRAZOLE SODIUM 40 MILLIGRAM(S): 20 TABLET, DELAYED RELEASE ORAL at 06:32

## 2024-03-01 RX ADMIN — Medication 1000 MILLIGRAM(S): at 06:15

## 2024-03-01 RX ADMIN — CELECOXIB 200 MILLIGRAM(S): 200 CAPSULE ORAL at 05:34

## 2024-03-01 NOTE — OCCUPATIONAL THERAPY INITIAL EVALUATION ADULT - SOCIAL CONCERNS
Pt voiced concerns about her recovery at home. Pt endorsed that her domestic partner  will be able to assist her after she discharged home/Complex psychosocial needs/coping issues

## 2024-03-01 NOTE — OCCUPATIONAL THERAPY INITIAL EVALUATION ADULT - RANGE OF MOTION EXAMINATION, LOWER EXTREMITY
AAROM in right knee is 0-60 degrees with pain/Left LE Active ROM was WNL  (within normal limits)/Left LE Passive ROM was WNL (within normal limits)

## 2024-03-01 NOTE — OCCUPATIONAL THERAPY INITIAL EVALUATION ADULT - PRECAUTIONS/LIMITATIONS, REHAB EVAL
total knee protocol. No towels or pillows should be placed under right knee, but should be placed under ankle/fall precautions/obesity precautions

## 2024-03-01 NOTE — OCCUPATIONAL THERAPY INITIAL EVALUATION ADULT - LEVEL OF INDEPENDENCE: EATING, OT EVAL
HTN with CKD stage 3 / HL / Aortic stenosis and calcification / LBBB / Abnormal stress test  Well controlled  Hx/o abnormal EKG and stress test   TTE (4/2/19): LVH, G1DD, mild aortic stenosis  CPM (Lisinopril 10mg daily)  CPM (Simvastatin 20mg qhs)  Cont ASA  Cardio following, LV: 4/2019, f/u 1 yr   independent

## 2024-03-01 NOTE — OCCUPATIONAL THERAPY INITIAL EVALUATION ADULT - LIVES WITH, PROFILE
in a private with 1 threshold step (can fit walker) to enter. Once inside, the pt has 1 step (can fit walker) and then has 12 steps (Bilateral handrails half way up and then R handed all the way up) to reach main floor where the bedroom and bathroom is. The pts bathroom has a walk in shower stall, fixed/retractable shower head, standard toilet seat and no grab bars.. Pt's toilet has adequate space to fit a commode over it./alone/significant other

## 2024-03-01 NOTE — OCCUPATIONAL THERAPY INITIAL EVALUATION ADULT - GENERAL OBSERVATIONS, REHAB EVAL
Pt was seen for initial OT consult, encountered OOB to chair in NAD. Right knee dressing is clean dry and intact. Grade 1+ edema with stiffness and decreased ROM noted in RLE. Pt was AA&Ox4, cooperative & followed commands. Pt c/o right knee pain due to s/p TKR; this limits pt's activity tolerance ,balance, ADL management and functional mobility.

## 2024-03-01 NOTE — OCCUPATIONAL THERAPY INITIAL EVALUATION ADULT - NSOTDISCHREC_GEN_A_CORE
to prevent falls, optimize pt's ability for ADL management & safely navigate in all terrains . Pt has a rolling walker , SAC and 3:1 commode/Home OT

## 2024-03-01 NOTE — OCCUPATIONAL THERAPY INITIAL EVALUATION ADULT - PERTINENT HX OF CURRENT PROBLEM, REHAB EVAL
Pt is a 52 y/o female admitted for elective surgery for right TKR, with MD Tristan on 2/29/24 due to OA, chronic pain and DJD.

## 2024-03-01 NOTE — DISCHARGE NOTE NURSING/CASE MANAGEMENT/SOCIAL WORK - PATIENT PORTAL LINK FT
Problem: Adult Inpatient Plan of Care  Goal: Plan of Care Review  Outcome: Improving     Problem: Adult Inpatient Plan of Care  Goal: Optimal Comfort and Wellbeing  Outcome: Improving   Alert and oriented on this  shift.otherwise denies pain. Continuous IV fluids infusing per orders. Up to bathroom with SBA. Bed alarm on for safety. Calls appropriately.   You can access the FollowMyHealth Patient Portal offered by [facility] by registering at the following website: [email or web address]. By joining [facility]’s FollowMyHealth portal, you will also be able to view your health information using other applications (apps) compatible with our system.

## 2024-03-01 NOTE — PROGRESS NOTE ADULT - SUBJECTIVE AND OBJECTIVE BOX
Pt seen at bedside. No acute complaints, pain is well managed. Denies numbness, tingling, fevers, chills, CP, SOB, N/V/D.    Vital Signs (24 Hrs):  T(C): 36.4 (03-01-24 @ 04:40), Max: 36.8 (02-29-24 @ 09:44)  HR: 72 (03-01-24 @ 04:40) (71 - 98)  BP: 116/70 (03-01-24 @ 04:40) (116/70 - 159/87)  RR: 18 (03-01-24 @ 04:40) (12 - 20)  SpO2: 97% (03-01-24 @ 04:40) (94% - 100%)  Wt(kg): --    LABS:                          12.2   8.80  )-----------( 215      ( 29 Feb 2024 14:32 )             37.1     02-29    141  |  111<H>  |  18  ----------------------------<  184<H>  4.0   |  22  |  0.61    Ca    8.3<L>      29 Feb 2024 14:32        PT/INR - ( 29 Feb 2024 09:35 )   PT: 13.2 sec;   INR: 1.10 ratio         PTT - ( 29 Feb 2024 09:35 )  PTT:42.2 sec    Physical Exam:  General: NAD, pt laying in bed comfortably  SCDs in place BL    RLE:  Dressings C/D/I  Compartments soft, compressible  Calves nontender  Motor: +GSC, TA, EHL, FHL  SILT: SPN, DPN, Tib, Saph, Navin  +DP/PT    A/P: 53F POD1 R TKA    WBAT  PT/OT  Analgesics  Continue postop ABx for 24 hrs  DVT PPx start POD1  Incentive spirometry  Dispo: to home per PT eval  Appreciate medical recs  Ortho stable for DC  Will discuss plan with Dr. Tristan and adjust plan as necessary  
Postop check s/p R TKA    Pt seen at bedside. No acute complaints, pain is well managed. Denies numbness, tingling, fevers, chills, CP, SOB, N/V/D.    Vital Signs (24 Hrs):  T(C): 36.6 (02-29-24 @ 14:22), Max: 36.8 (02-29-24 @ 09:44)  HR: 74 (02-29-24 @ 15:30) (71 - 98)  BP: 145/80 (02-29-24 @ 15:30) (137/93 - 155/86)  RR: 14 (02-29-24 @ 15:30) (12 - 20)  SpO2: 98% (02-29-24 @ 15:30) (94% - 100%)  Wt(kg): --    LABS:                          12.2   8.80  )-----------( 215      ( 29 Feb 2024 14:32 )             37.1     02-29    141  |  111<H>  |  18  ----------------------------<  184<H>  4.0   |  22  |  0.61    Ca    8.3<L>      29 Feb 2024 14:32        PT/INR - ( 29 Feb 2024 09:35 )   PT: 13.2 sec;   INR: 1.10 ratio         PTT - ( 29 Feb 2024 09:35 )  PTT:42.2 sec    Physical Exam:  General: NAD, pt laying in bed comfortably  SCDs in place BL    RLE:  Dressings C/D/I  Hemovac drain in place  Compartments soft, compressible  Calves nontender  Motor: +GSC, TA, EHL, FHL  SILT: SPN, DPN, Tib, Saph, Navin  +DP    A/P: 53F POD0 R TKA    WBAT  PT/OT  Follow up PACU labs  Analgesics  Record drain outputs  Continue postop ABx for 24 hrs  DVT PPx start POD1  Incentive spirometry  Dispo: pending PT eval  Appreciate medical recs  Will discuss plan with Dr. Tristan and adjust plan as necessary

## 2024-03-08 DIAGNOSIS — M17.11 UNILATERAL PRIMARY OSTEOARTHRITIS, RIGHT KNEE: ICD-10-CM

## 2024-03-08 DIAGNOSIS — I10 ESSENTIAL (PRIMARY) HYPERTENSION: ICD-10-CM

## 2024-03-12 RX ORDER — OXYCODONE 5 MG/1
5 TABLET ORAL
Qty: 40 | Refills: 0 | Status: ACTIVE | COMMUNITY
Start: 2024-03-12 | End: 1900-01-01

## 2024-03-22 ENCOUNTER — APPOINTMENT (OUTPATIENT)
Dept: ORTHOPEDIC SURGERY | Facility: CLINIC | Age: 54
End: 2024-03-22
Payer: COMMERCIAL

## 2024-03-22 PROBLEM — K21.9 GASTRO-ESOPHAGEAL REFLUX DISEASE WITHOUT ESOPHAGITIS: Chronic | Status: ACTIVE | Noted: 2024-02-09

## 2024-03-22 PROBLEM — M17.12 UNILATERAL PRIMARY OSTEOARTHRITIS, LEFT KNEE: Chronic | Status: ACTIVE | Noted: 2024-02-09

## 2024-03-22 PROBLEM — C50.912 MALIGNANT NEOPLASM OF UNSPECIFIED SITE OF LEFT FEMALE BREAST: Chronic | Status: ACTIVE | Noted: 2024-02-09

## 2024-03-22 PROCEDURE — 99024 POSTOP FOLLOW-UP VISIT: CPT

## 2024-03-22 NOTE — PROCEDURE
[de-identified] : Observation on incision dry, clean, intact, well healed. Method staple removing kit. Suture site Cleaned with iodine swab after sutures are completely removed. Instructions Keep incision dry and clean, allowed to shower and pat site dry, do not rub dry, contact office is site becomes red, swollen, infected, or you develop a fever.

## 2024-03-22 NOTE — HISTORY OF PRESENT ILLNESS
[Chills] : no chills [Constipation] : no constipation [Diarrhea] : no diarrhea [Dysuria] : no dysuria [Fever] : no fever [Nausea] : no nausea [Vomiting] : no vomiting [Clean/Dry/Intact] : clean, dry and intact [Healed] : healed [Erythema] : not erythematous [Discharge] : absent of discharge [Swelling] : swollen [Dehiscence] : not dehisced [Neuro Intact] : an unremarkable neurological exam [Vascular Intact] : ~T peripheral vascular exam normal [Negative Stephan's] : maneuvers demonstrated a negative Stephan's sign [Doing Well] : is doing well [Adequate Pain Control] : has adequate pain control [No Sign of Infection] : is showing no signs of infection [de-identified] : Patient presents today for F/U S/P Right TKR done 3 weeks ago. Patient is doing well and undergoing P.T. with improvement. Takes pain meds and DVT prophylaxis. I went over post-op care and answered all questions. I also provided patient with surgical report for their records. [Staples Removed] : staples were removed [de-identified] : Continue P.T. pain management and DVT prophylaxis. F/U in 1 month with x-rays. [de-identified] : ROM 0-85 degrees

## 2024-04-15 ENCOUNTER — APPOINTMENT (OUTPATIENT)
Dept: ORTHOPEDIC SURGERY | Facility: CLINIC | Age: 54
End: 2024-04-15
Payer: COMMERCIAL

## 2024-04-15 VITALS — BODY MASS INDEX: 30.73 KG/M2 | WEIGHT: 180 LBS | HEIGHT: 64 IN

## 2024-04-15 PROCEDURE — 99024 POSTOP FOLLOW-UP VISIT: CPT

## 2024-04-15 PROCEDURE — 73562 X-RAY EXAM OF KNEE 3: CPT | Mod: RT

## 2024-04-15 RX ORDER — TRAMADOL HYDROCHLORIDE 50 MG/1
50 TABLET, COATED ORAL
Qty: 30 | Refills: 0 | Status: ACTIVE | COMMUNITY
Start: 2024-04-15 | End: 1900-01-01

## 2024-04-15 NOTE — PHYSICAL EXAM
[de-identified] : General appearance: well-nourished and hydrated, pleasant, alert and oriented x 3, cooperative. HEENT: Normocephalic, EOM intact, Nasal septum midline, Oral cavity clear, External auditory canal clear. Cardiovascular: no apparent abnormalities, no lower leg edema, no varicosities, pedal pulses are palpable. Lymphatics Lymph nodes: none palpated, Lymphedema: not present. Neurologic: sensation is normal, no muscle weakness in upper or lower extremities, patella tendon reflexes intact. Dermatologic no apparent skin lesions, moist, warm, no rash. Spine: cervical spine appears normal and moves freely, thoracic spine appears normal and moves freely, lumbosacral spine appears normal and moves freely. Gait: nonantalgic.  Right Knee  Inspection: mild soft tissue swelling Wounds: healed midline incision  Alignment: normal. Palpation: no specific tenderness on palpation. ROM: Active (in degrees): 0-90 Ligamentous laxity (neg): negative ant. drawer test, negative post. drawer test, stable to varus stress test, stable to valgus stress test,  Patellofemoral Alignment Test: Q angle-, normal. Muscle Test: good quad strength. Leg examination: calf is soft and non-tender.  [de-identified] : Right knee x-ray, AP, lateral, merchant view taken at the office today demonstrates a total knee replacement in satisfactory position and alignment. No evidence of loosening. The patella sits in a central position. Joint effusion.   Left knee x-ray merchant view taken at the office today demonstrates good joint space and a well centered patella.

## 2024-04-15 NOTE — ADDENDUM
[FreeTextEntry1] : This note was written by Liyah Mendoza on 04/15/2024 acting as scribe for Dr. Igor Tristan M.D.   I, Dr. Igor Tristan, have read and attest that all the information, medical decision making and discharge instructions within are true and accurate.

## 2024-04-15 NOTE — HISTORY OF PRESENT ILLNESS
[de-identified] : BANDAR SABILLON  53 year old female presents for evaluation of right TKR at 6 weeks. She completed 10 visits of at home PT which she notes a week lapse until starting outpatient therapy. She has attended 3 sessions of PT so far. Patient notes a lot of swelling since surgery but denies much pain. She is concern ed about ROM as it is about 5-90 today which was pretty much the same pre-operatively.

## 2024-04-15 NOTE — DISCUSSION/SUMMARY
[de-identified] : Patient is doing well and making progress following their s/p Right TKR. I reviewed x-rays with them and compared to prior films. I have reassured them that their implants are functioning well. All questions answered, understanding verbalized. She is encouraged to continue to stay active with PT and activities as tolerated. I recommended Tramadol prn.    I will see them back in 6-8 weeks.

## 2024-04-15 NOTE — CONSULT LETTER
[Dear  ___] : Dear  [unfilled], [Consult Letter:] : I had the pleasure of evaluating your patient, [unfilled]. [Please see my note below.] : Please see my note below. [Consult Closing:] : Thank you very much for allowing me to participate in the care of this patient.  If you have any questions, please do not hesitate to contact me. [Sincerely,] : Sincerely, [FreeTextEntry3] : Dr. Igor Tristan

## 2024-04-19 ENCOUNTER — NON-APPOINTMENT (OUTPATIENT)
Age: 54
End: 2024-04-19

## 2024-05-17 ENCOUNTER — RX RENEWAL (OUTPATIENT)
Age: 54
End: 2024-05-17

## 2024-05-20 ENCOUNTER — NON-APPOINTMENT (OUTPATIENT)
Age: 54
End: 2024-05-20

## 2024-06-10 ENCOUNTER — RX RENEWAL (OUTPATIENT)
Age: 54
End: 2024-06-10

## 2024-06-10 ENCOUNTER — APPOINTMENT (OUTPATIENT)
Dept: ORTHOPEDIC SURGERY | Facility: CLINIC | Age: 54
End: 2024-06-10
Payer: COMMERCIAL

## 2024-06-10 VITALS — HEIGHT: 64 IN | WEIGHT: 180 LBS | BODY MASS INDEX: 30.73 KG/M2

## 2024-06-10 DIAGNOSIS — Z96.651 PRESENCE OF RIGHT ARTIFICIAL KNEE JOINT: ICD-10-CM

## 2024-06-10 DIAGNOSIS — M17.0 BILATERAL PRIMARY OSTEOARTHRITIS OF KNEE: ICD-10-CM

## 2024-06-10 PROCEDURE — G2211 COMPLEX E/M VISIT ADD ON: CPT | Mod: NC

## 2024-06-10 PROCEDURE — 99213 OFFICE O/P EST LOW 20 MIN: CPT

## 2024-06-10 PROCEDURE — 73562 X-RAY EXAM OF KNEE 3: CPT | Mod: RT

## 2024-06-10 RX ORDER — CELECOXIB 200 MG/1
200 CAPSULE ORAL
Qty: 30 | Refills: 1 | Status: ACTIVE | COMMUNITY
Start: 2024-04-15 | End: 1900-01-01

## 2024-06-10 NOTE — CONSULT LETTER
[Dear  ___] : Dear  [unfilled], [Consult Letter:] : I had the pleasure of evaluating your patient, [unfilled]. [Please see my note below.] : Please see my note below. [Consult Closing:] : Thank you very much for allowing me to participate in the care of this patient.  If you have any questions, please do not hesitate to contact me. [Sincerely,] : Sincerely, [FreeTextEntry2] : PEYTON PIEDRA

## 2024-06-10 NOTE — DISCUSSION/SUMMARY
[de-identified] : Patient is doing well following their s/p R TKR. I reviewed x-rays with them and compared to prior films. I have reassured them that their implants are functioning well and that they are making good progress. All questions answered, understanding verbalized.  She is encouraged to continue physical therapy working on motion and strength. She can continue activity as tolerated.  I will see them back in 3 months with x-rays.

## 2024-06-10 NOTE — ADDENDUM
[FreeTextEntry1] : This note was written by Tracey Lin on 06/10/2024 acting as scribe for Dr. Igor CARTER I, Dr. Igor Tristan, have read and attest that all the information, medical decision making and discharge instructions within are true and accurate.  This note was written by Shen Moseley on 06/10/2024 acting as a scribe for Dr. Igor CARTER I, Dr. Igor Tristan, have read and attest that all the information, medical decision making and discharge instructions within are true and accurate.

## 2024-06-10 NOTE — HISTORY OF PRESENT ILLNESS
[de-identified] : BANDAR RAMANDEEP 53-year-old female presents for evaluation of 3 months s/p a right TKR doing well. She goes to physical therapy twice a week and has with extreme flexion but denies taking anything for it. Patient was at 0-90 pre-op, which is where she is at today. She is doing all her activities without any issues.

## 2024-08-26 NOTE — DISCUSSION/SUMMARY
[FreeTextEntry1] : Right foot pain edema recurrent\par ORTHO noted\par Lower extremity edema Resolved \par recheck blood pressure avoid beta-blocker hold further calcium channel blocker- normal BP\par Check serum electrolytes CBC sedimentation rate reviewed\par Continue losartan 100 mg daily\par Add low-dose Norvasc 2.5 mg-discontinue secondary to lower extremity edema OFF\par Recheck blood pressure 3 month stable\par has  been off BREO\par  add symbicort 2 puffs BID and Rinse \par Notify of any wheezing.  Notify if rescue inhaler is needed greater than 2-3 times in any week.  Avoid known triggers.\par \par  diagnosis breast cancer DCIS\par sinusitis hx\par OAD\par Rx updated\par No evidence for active respiratory impairment\par Stable pulmonary physiology\par As needed short acting bronchodilator\par Notify of any wheezing.  Notify if rescue inhaler is needed greater than 2-3 times in any week.  Avoid known triggers.\par COVID-19 precautions\par Rheum consult  joint pains Detail Level: Detailed Size Of Lesion: 0.6 Morphology Per Location (Optional): Brown and light brown macule, with central hypopigmentation on ELM Instructions (Optional): RTO if lesion changes in size, color or shape or becomes symptomatic.

## 2024-09-13 ENCOUNTER — APPOINTMENT (OUTPATIENT)
Dept: ORTHOPEDIC SURGERY | Facility: CLINIC | Age: 54
End: 2024-09-13
Payer: COMMERCIAL

## 2024-09-13 VITALS — BODY MASS INDEX: 30.73 KG/M2 | WEIGHT: 180 LBS | HEIGHT: 64 IN

## 2024-09-13 DIAGNOSIS — Z96.651 PRESENCE OF RIGHT ARTIFICIAL KNEE JOINT: ICD-10-CM

## 2024-09-13 DIAGNOSIS — M17.0 BILATERAL PRIMARY OSTEOARTHRITIS OF KNEE: ICD-10-CM

## 2024-09-13 PROCEDURE — 99213 OFFICE O/P EST LOW 20 MIN: CPT

## 2024-09-13 PROCEDURE — G2211 COMPLEX E/M VISIT ADD ON: CPT | Mod: NC

## 2024-09-13 PROCEDURE — 73562 X-RAY EXAM OF KNEE 3: CPT | Mod: RT

## 2024-09-13 NOTE — ADDENDUM
[FreeTextEntry1] : This note was written by Alejo Mendoza on 09/13/2024 acting as scribe for Dr. Igor Tristan M.D.  I, Dr. Igor Tristan, have read and attest that all the information, medical decision making and discharge instructions within are true and accurate.

## 2024-09-13 NOTE — PHYSICAL EXAM
[de-identified] : General appearance: well-nourished and hydrated, pleasant, alert and oriented x 3, cooperative. HEENT: Normocephalic, EOM intact, Nasal septum midline, Oral cavity clear, External auditory canal clear. Cardiovascular: no apparent abnormalities, no lower leg edema, no varicosities, pedal pulses are palpable. Lymphatics Lymph nodes: none palpated, Lymphedema: not present. Neurologic: sensation is normal, no muscle weakness in upper or lower extremities, patella tendon reflexes intact. Dermatologic no apparent skin lesions, moist, warm, no rash. Spine: cervical spine appears normal and moves freely, thoracic spine appears normal and moves freely, lumbosacral spine appears normal and moves freely. Gait: nonantalgic.  Right Knee  Inspection: minimal soft tissue swelling Wounds: well-healed midline incision  Alignment: normal. Palpation: no specific tenderness on palpation. ROM: Active (in degrees): 0-95 Ligamentous laxity (neg): negative ant. drawer test, negative post. drawer test, stable to varus stress test, stable to valgus stress test,  Patellofemoral Alignment Test: Q angle-, normal. Muscle Test: good quad strength. Leg examination: calf is soft and non-tender.  [de-identified] : Right knee x-ray, AP, lateral, merchant view taken at the office today demonstrates a total knee replacement in satisfactory position and alignment. No evidence of loosening. The patella sits in a central position.  Left knee x-ray merchant view taken at the office today demonstrates joint space narrowing and a well centered patella.

## 2024-09-13 NOTE — DISCUSSION/SUMMARY
[de-identified] : The patient is doing well regarding their right TKR. She has improved her range of motion from her pre-operative motion. She will continue physical therapy to improve strength and endurance.   Patient can continue home exercises, Tylenol, weight management and activities as tolerated. All questions were answered, understanding verbalized. Patient is in agreement with plan of treatment. Patient may follow up in 6 months.

## 2024-09-13 NOTE — HISTORY OF PRESENT ILLNESS
[de-identified] :  BANDAR RAMANDEEP 54 year old female presents for follow up evaluation of 6 months s/p right TKR. She is still undergoing physical therapy for better motion. She was very stiff prior to surgery. Other than that, she states she has no pain and is doing well.

## 2024-11-14 ENCOUNTER — RX RENEWAL (OUTPATIENT)
Age: 54
End: 2024-11-14

## 2024-11-16 ENCOUNTER — RX RENEWAL (OUTPATIENT)
Age: 54
End: 2024-11-16

## 2025-02-13 ENCOUNTER — RX RENEWAL (OUTPATIENT)
Age: 55
End: 2025-02-13

## 2025-02-28 ENCOUNTER — LABORATORY RESULT (OUTPATIENT)
Age: 55
End: 2025-02-28

## 2025-02-28 ENCOUNTER — APPOINTMENT (OUTPATIENT)
Dept: PULMONOLOGY | Facility: CLINIC | Age: 55
End: 2025-02-28
Payer: COMMERCIAL

## 2025-02-28 ENCOUNTER — NON-APPOINTMENT (OUTPATIENT)
Age: 55
End: 2025-02-28

## 2025-02-28 VITALS
HEART RATE: 105 BPM | TEMPERATURE: 97.7 F | HEIGHT: 64 IN | BODY MASS INDEX: 31.58 KG/M2 | DIASTOLIC BLOOD PRESSURE: 79 MMHG | OXYGEN SATURATION: 96 % | WEIGHT: 185 LBS | SYSTOLIC BLOOD PRESSURE: 133 MMHG

## 2025-02-28 DIAGNOSIS — M54.16 RADICULOPATHY, LUMBAR REGION: ICD-10-CM

## 2025-02-28 DIAGNOSIS — Z00.00 ENCOUNTER FOR GENERAL ADULT MEDICAL EXAMINATION W/OUT ABNORMAL FINDINGS: ICD-10-CM

## 2025-02-28 DIAGNOSIS — C50.919 MALIGNANT NEOPLASM OF UNSPECIFIED SITE OF UNSPECIFIED FEMALE BREAST: ICD-10-CM

## 2025-02-28 DIAGNOSIS — J44.9 CHRONIC OBSTRUCTIVE PULMONARY DISEASE, UNSPECIFIED: ICD-10-CM

## 2025-02-28 DIAGNOSIS — I10 ESSENTIAL (PRIMARY) HYPERTENSION: ICD-10-CM

## 2025-02-28 DIAGNOSIS — D72.10 EOSINOPHILIA, UNSPECIFIED: ICD-10-CM

## 2025-02-28 LAB
ALBUMIN: 30
BILIRUB UR QL STRIP: NEGATIVE
CLARITY UR: CLEAR
COLLECTION METHOD: NORMAL
CREATININE: 200
GLUCOSE UR-MCNC: NEGATIVE
HCG UR QL: 0.2 EU/DL
HGB UR QL STRIP.AUTO: NEGATIVE
KETONES UR-MCNC: NEGATIVE
LEUKOCYTE ESTERASE UR QL STRIP: NORMAL
MICROALBUMIN/CREAT UR TEST STR-RTO: <30
NITRITE UR QL STRIP: NEGATIVE
PH UR STRIP: 5
POCT - HEMOGLOBIN (HGB), QUANTITATIVE, TRANSCUTANEOUS: 13.1
PROT UR STRIP-MCNC: NEGATIVE
SP GR UR STRIP: 1.02

## 2025-02-28 PROCEDURE — 94727 GAS DIL/WSHOT DETER LNG VOL: CPT

## 2025-02-28 PROCEDURE — 88738 HGB QUANT TRANSCUTANEOUS: CPT

## 2025-02-28 PROCEDURE — 36415 COLL VENOUS BLD VENIPUNCTURE: CPT

## 2025-02-28 PROCEDURE — 94729 DIFFUSING CAPACITY: CPT

## 2025-02-28 PROCEDURE — 82044 UR ALBUMIN SEMIQUANTITATIVE: CPT | Mod: QW

## 2025-02-28 PROCEDURE — 94010 BREATHING CAPACITY TEST: CPT

## 2025-02-28 PROCEDURE — 81003 URINALYSIS AUTO W/O SCOPE: CPT | Mod: QW

## 2025-02-28 PROCEDURE — 71046 X-RAY EXAM CHEST 2 VIEWS: CPT

## 2025-02-28 PROCEDURE — 99396 PREV VISIT EST AGE 40-64: CPT

## 2025-03-01 ENCOUNTER — NON-APPOINTMENT (OUTPATIENT)
Age: 55
End: 2025-03-01

## 2025-03-01 LAB
25(OH)D3 SERPL-MCNC: 48.3 NG/ML
ALBUMIN SERPL ELPH-MCNC: 4.3 G/DL
ALP BLD-CCNC: 106 U/L
ALT SERPL-CCNC: 28 U/L
ANION GAP SERPL CALC-SCNC: 15 MMOL/L
AST SERPL-CCNC: 50 U/L
BASOPHILS # BLD AUTO: 0.06 K/UL
BASOPHILS NFR BLD AUTO: 0.9 %
BILIRUB DIRECT SERPL-MCNC: 0.1 MG/DL
BILIRUB INDIRECT SERPL-MCNC: 0.2 MG/DL
BILIRUB SERPL-MCNC: 0.3 MG/DL
BUN SERPL-MCNC: 15 MG/DL
CALCIUM SERPL-MCNC: 9.2 MG/DL
CHLORIDE SERPL-SCNC: 101 MMOL/L
CHOLEST SERPL-MCNC: 217 MG/DL
CO2 SERPL-SCNC: 24 MMOL/L
CREAT SERPL-MCNC: 0.61 MG/DL
EGFR: 105 ML/MIN/1.73M2
EOSINOPHIL # BLD AUTO: 0.41 K/UL
EOSINOPHIL NFR BLD AUTO: 6.2 %
ESTIMATED AVERAGE GLUCOSE: 114 MG/DL
GLUCOSE SERPL-MCNC: 125 MG/DL
HBA1C MFR BLD HPLC: 5.6 %
HCT VFR BLD CALC: 40.2 %
HCV AB SER QL: NONREACTIVE
HCV S/CO RATIO: 0.47 S/CO
HDLC SERPL-MCNC: 64 MG/DL
HGB BLD-MCNC: 13.1 G/DL
IMM GRANULOCYTES NFR BLD AUTO: 0.3 %
LDLC SERPL CALC-MCNC: 97 MG/DL
LYMPHOCYTES # BLD AUTO: 1.76 K/UL
LYMPHOCYTES NFR BLD AUTO: 26.4 %
MAN DIFF?: NORMAL
MCHC RBC-ENTMCNC: 31.1 PG
MCHC RBC-ENTMCNC: 32.6 G/DL
MCV RBC AUTO: 95.5 FL
MONOCYTES # BLD AUTO: 0.5 K/UL
MONOCYTES NFR BLD AUTO: 7.5 %
NEUTROPHILS # BLD AUTO: 3.91 K/UL
NEUTROPHILS NFR BLD AUTO: 58.7 %
NONHDLC SERPL-MCNC: 153 MG/DL
PLATELET # BLD AUTO: 225 K/UL
POTASSIUM SERPL-SCNC: 4.5 MMOL/L
PROT SERPL-MCNC: 7.2 G/DL
RBC # BLD: 4.21 M/UL
RBC # FLD: 12.7 %
SODIUM SERPL-SCNC: 140 MMOL/L
T3 SERPL-MCNC: 116 NG/DL
T3RU NFR SERPL: 1.2 TBI
T4 FREE SERPL-MCNC: 0.9 NG/DL
T4 SERPL-MCNC: 7.5 UG/DL
TRIGL SERPL-MCNC: 340 MG/DL
TSH SERPL-ACNC: 2.35 UIU/ML
WBC # FLD AUTO: 6.66 K/UL

## 2025-03-14 ENCOUNTER — APPOINTMENT (OUTPATIENT)
Dept: ORTHOPEDIC SURGERY | Facility: CLINIC | Age: 55
End: 2025-03-14

## 2025-05-15 ENCOUNTER — RX RENEWAL (OUTPATIENT)
Age: 55
End: 2025-05-15

## 2025-05-19 ENCOUNTER — NON-APPOINTMENT (OUTPATIENT)
Age: 55
End: 2025-05-19

## 2025-08-11 ENCOUNTER — RX RENEWAL (OUTPATIENT)
Age: 55
End: 2025-08-11

## 2025-09-04 ENCOUNTER — APPOINTMENT (OUTPATIENT)
Dept: PULMONOLOGY | Facility: CLINIC | Age: 55
End: 2025-09-04
Payer: COMMERCIAL

## 2025-09-04 VITALS — SYSTOLIC BLOOD PRESSURE: 126 MMHG | DIASTOLIC BLOOD PRESSURE: 85 MMHG | OXYGEN SATURATION: 92 % | HEART RATE: 108 BPM

## 2025-09-04 DIAGNOSIS — J44.9 CHRONIC OBSTRUCTIVE PULMONARY DISEASE, UNSPECIFIED: ICD-10-CM

## 2025-09-04 LAB — POCT - HEMOGLOBIN (HGB), QUANTITATIVE, TRANSCUTANEOUS: 13.3

## 2025-09-04 PROCEDURE — 94729 DIFFUSING CAPACITY: CPT

## 2025-09-04 PROCEDURE — 94727 GAS DIL/WSHOT DETER LNG VOL: CPT

## 2025-09-04 PROCEDURE — 94060 EVALUATION OF WHEEZING: CPT

## 2025-09-04 PROCEDURE — 99214 OFFICE O/P EST MOD 30 MIN: CPT | Mod: 25

## 2025-09-04 PROCEDURE — 95012 NITRIC OXIDE EXP GAS DETER: CPT

## 2025-09-04 PROCEDURE — 88738 HGB QUANT TRANSCUTANEOUS: CPT

## (undated) DEVICE — TOURNIQUET ESMARK 6"

## (undated) DEVICE — DRAPE STERI-DRAPE INCISE 19X17"

## (undated) DEVICE — DRSG AQUACEL 3.5 X 10"

## (undated) DEVICE — NDL HYPO SAFE 22G X 1.5" (BLACK)

## (undated) DEVICE — SUT VICRYL 0 18" CT-1 UNDYED (POP-OFF)

## (undated) DEVICE — PACK BASIC

## (undated) DEVICE — DRSG ACE BANDAGE 6"

## (undated) DEVICE — DRAPE LIMB BILATERAL

## (undated) DEVICE — DRSG WEBRIL 6"

## (undated) DEVICE — HOOD FLYTE STRYKER HELMET SHIELD

## (undated) DEVICE — SYR LUER LOK 50CC

## (undated) DEVICE — PREP CHLORAPREP HI-LITE ORANGE 26ML

## (undated) DEVICE — ZIMMER MIXING BOWL WITH SPATULA

## (undated) DEVICE — DRAPE TOWEL BLUE 17" X 24"

## (undated) DEVICE — BLADE SURGICAL #20 CARBON

## (undated) DEVICE — SAW SAGITTAL 2108 SERIES 20.5X1.27X85MM

## (undated) DEVICE — FRA-ESU BOVIE FORCE FX F3B25828A: Type: DURABLE MEDICAL EQUIPMENT

## (undated) DEVICE — NDL HYPO SAFE 18G X 1.5" (PINK)

## (undated) DEVICE — CRYO/CUFF GRAVITY COOLER KNEE LARGE

## (undated) DEVICE — SAW BLADE STRYKER SAGITTAL DUAL CUT 75X18X1.27MM

## (undated) DEVICE — SUCTION YANKAUER NO CONTROL VENT

## (undated) DEVICE — DRAPE 3/4 SHEET W REINFORCEMENT 56X77"

## (undated) DEVICE — DRAPE INSTRUMENT POUCH 6.75" X 11"

## (undated) DEVICE — DRAPE EXTREMITY 87" X 128.5"

## (undated) DEVICE — DRAIN JACKSON PRATT 3 SPRING RESERVOIR W 10FR PVC DRAIN

## (undated) DEVICE — GLV 9 PROTEXIS (WHITE)

## (undated) DEVICE — ZIMMER PULSAVAC PLUS FAN KIT

## (undated) DEVICE — SYR LUER LOK 10CC

## (undated) DEVICE — DRSG AQUACEL 3.5 X 14"

## (undated) DEVICE — DRAPE SHOWER CURTAIN ISOLATION

## (undated) DEVICE — BLADE SURGICAL #15 CARBON

## (undated) DEVICE — DRAPE STERI-DRAPE INCISE 32X33"

## (undated) DEVICE — FRA-TOURNIQUET 402010060005: Type: DURABLE MEDICAL EQUIPMENT

## (undated) DEVICE — DRSG AQUACEL 3.5 X 12"

## (undated) DEVICE — MARKING PEN W RULER

## (undated) DEVICE — GLV 9 PROTEXIS ORTHO (BROWN)

## (undated) DEVICE — MEDICATION LABELS W MARKER

## (undated) DEVICE — ZIMMER BLADE PATELLA REAMER W PILOT HOLE SZ 32

## (undated) DEVICE — ZIMMER BLADE PATELLA REAMER 35MM

## (undated) DEVICE — SUT VICRYL 2-0 27" CT-2 UNDYED